# Patient Record
Sex: FEMALE | Race: WHITE | Employment: FULL TIME | URBAN - NONMETROPOLITAN AREA
[De-identification: names, ages, dates, MRNs, and addresses within clinical notes are randomized per-mention and may not be internally consistent; named-entity substitution may affect disease eponyms.]

---

## 2017-01-30 ENCOUNTER — TELEPHONE (OUTPATIENT)
Dept: FAMILY MEDICINE CLINIC | Age: 42
End: 2017-01-30

## 2017-01-30 ENCOUNTER — OFFICE VISIT (OUTPATIENT)
Dept: FAMILY MEDICINE CLINIC | Age: 42
End: 2017-01-30

## 2017-01-30 VITALS
DIASTOLIC BLOOD PRESSURE: 88 MMHG | WEIGHT: 196.4 LBS | SYSTOLIC BLOOD PRESSURE: 146 MMHG | TEMPERATURE: 99 F | HEIGHT: 66 IN | HEART RATE: 106 BPM | OXYGEN SATURATION: 99 % | BODY MASS INDEX: 31.57 KG/M2

## 2017-01-30 DIAGNOSIS — J98.8 RESPIRATORY INFECTION: Primary | ICD-10-CM

## 2017-01-30 PROCEDURE — 99213 OFFICE O/P EST LOW 20 MIN: CPT | Performed by: FAMILY MEDICINE

## 2017-01-30 RX ORDER — ALBUTEROL SULFATE 90 UG/1
2 AEROSOL, METERED RESPIRATORY (INHALATION) EVERY 6 HOURS PRN
Qty: 1 INHALER | Refills: 3 | Status: SHIPPED | OUTPATIENT
Start: 2017-01-30 | End: 2018-04-26 | Stop reason: SDUPTHER

## 2017-01-30 RX ORDER — PREDNISONE 10 MG/1
TABLET ORAL
Qty: 20 TABLET | Refills: 0 | Status: SHIPPED | OUTPATIENT
Start: 2017-01-30 | End: 2019-04-05

## 2017-01-30 ASSESSMENT — ENCOUNTER SYMPTOMS
SORE THROAT: 1
GASTROINTESTINAL NEGATIVE: 1
VOICE CHANGE: 1
COUGH: 1

## 2017-12-19 DIAGNOSIS — E89.0 POSTOPERATIVE HYPOTHYROIDISM: Primary | ICD-10-CM

## 2017-12-19 NOTE — TELEPHONE ENCOUNTER
Last Appt:  Visit date 1/30/17 GG  Next Appt:   Visit date not found  Med verified in [address] [place] [address] in 2016

## 2017-12-21 RX ORDER — LEVOTHYROXINE SODIUM 0.15 MG/1
TABLET ORAL
Qty: 90 TABLET | Refills: 3 | OUTPATIENT
Start: 2017-12-21

## 2018-02-12 ENCOUNTER — OFFICE VISIT (OUTPATIENT)
Dept: FAMILY MEDICINE CLINIC | Age: 43
End: 2018-02-12
Payer: COMMERCIAL

## 2018-02-12 VITALS
BODY MASS INDEX: 32.78 KG/M2 | DIASTOLIC BLOOD PRESSURE: 90 MMHG | SYSTOLIC BLOOD PRESSURE: 140 MMHG | WEIGHT: 204 LBS | HEIGHT: 66 IN | HEART RATE: 100 BPM | OXYGEN SATURATION: 98 %

## 2018-02-12 DIAGNOSIS — J30.0 CHRONIC VASOMOTOR RHINITIS: ICD-10-CM

## 2018-02-12 DIAGNOSIS — Z00.00 WELLNESS EXAMINATION: Primary | ICD-10-CM

## 2018-02-12 DIAGNOSIS — Z12.39 BREAST CANCER SCREENING: ICD-10-CM

## 2018-02-12 DIAGNOSIS — E89.0 POSTOPERATIVE HYPOTHYROIDISM: ICD-10-CM

## 2018-02-12 PROCEDURE — 99396 PREV VISIT EST AGE 40-64: CPT | Performed by: FAMILY MEDICINE

## 2018-02-12 NOTE — PATIENT INSTRUCTIONS
tablespoon of peanut butter, ½ ounce nuts or seeds, or ¼ cup of cooked beans equals 1 ounce of meat. · Learn how to read food labels for serving sizes and ingredients. Fast-food and convenience-food meals often contain few or no fruits or vegetables. Make sure you eat some fruits and vegetables to make the meal more nutritious. · Look at your food diary. For each food group, add up what you have eaten and then divide the total by the number of days. This will give you an idea of how much you are eating from each food group. See if you can find some ways to change your diet to make it more healthy. Start small  · Do not try to make dramatic changes to your diet all at once. You might feel that you are missing out on your favorite foods and then be more likely to fail. · Start slowly, and gradually change your habits. Try some of the following:  ¨ Use whole wheat bread instead of white bread. ¨ Use nonfat or low-fat milk instead of whole milk. ¨ Eat brown rice instead of white rice, and eat whole wheat pasta instead of white-flour pasta. ¨ Try low-fat cheeses and low-fat yogurt. ¨ Add more fruits and vegetables to meals and have them for snacks. ¨ Add lettuce, tomato, cucumber, and onion to sandwiches. ¨ Add fruit to yogurt and cereal.  Enjoy food  · You can still eat your favorite foods. You just may need to eat less of them. If your favorite foods are high in fat, salt, and sugar, limit how often you eat them, but do not cut them out entirely. · Eat a wide variety of foods. Make healthy choices when eating out  · The type of restaurant you choose can help you make healthy choices. Even fast-food chains are now offering more low-fat or healthier choices on the menu. · Choose smaller portions, or take half of your meal home. · When eating out, try:  ¨ A veggie pizza with a whole wheat crust or grilled chicken (instead of sausage or pepperoni).   ¨ Pasta with roasted vegetables, grilled chicken, or

## 2018-02-24 ENCOUNTER — HOSPITAL ENCOUNTER (OUTPATIENT)
Dept: LAB | Age: 43
Setting detail: SPECIMEN
Discharge: HOME | End: 2018-02-24
Payer: COMMERCIAL

## 2018-02-24 DIAGNOSIS — E89.0 POSTOPERATIVE HYPOTHYROIDISM: ICD-10-CM

## 2018-02-24 DIAGNOSIS — Z00.00 WELLNESS EXAMINATION: ICD-10-CM

## 2018-02-24 LAB
-: ABNORMAL
ABSOLUTE EOS #: 0.2 K/UL (ref 0–0.4)
ABSOLUTE IMMATURE GRANULOCYTE: ABNORMAL K/UL (ref 0–0.3)
ABSOLUTE LYMPH #: 1.7 K/UL (ref 1–4.8)
ABSOLUTE MONO #: 0.4 K/UL (ref 0.1–1.2)
AMORPHOUS: ABNORMAL
ANION GAP SERPL CALCULATED.3IONS-SCNC: 12 MMOL/L (ref 9–17)
BACTERIA: ABNORMAL
BASOPHILS # BLD: 1 % (ref 0–1)
BASOPHILS ABSOLUTE: 0 K/UL (ref 0–0.2)
BILIRUBIN URINE: NEGATIVE
BUN BLDV-MCNC: 14 MG/DL (ref 6–20)
BUN/CREAT BLD: 17 (ref 9–20)
CALCIUM SERPL-MCNC: 9.3 MG/DL (ref 8.6–10.4)
CASTS UA: ABNORMAL /LPF (ref 0–2)
CHLORIDE BLD-SCNC: 100 MMOL/L (ref 98–107)
CO2: 27 MMOL/L (ref 20–31)
COLOR: ABNORMAL
COMMENT UA: ABNORMAL
CREAT SERPL-MCNC: 0.84 MG/DL (ref 0.5–0.9)
CRYSTALS, UA: ABNORMAL /HPF
DIFFERENTIAL TYPE: ABNORMAL
EOSINOPHILS RELATIVE PERCENT: 3 % (ref 1–7)
EPITHELIAL CELLS UA: ABNORMAL /HPF (ref 0–5)
GFR AFRICAN AMERICAN: >60 ML/MIN
GFR NON-AFRICAN AMERICAN: >60 ML/MIN
GFR SERPL CREATININE-BSD FRML MDRD: NORMAL ML/MIN/{1.73_M2}
GFR SERPL CREATININE-BSD FRML MDRD: NORMAL ML/MIN/{1.73_M2}
GLUCOSE BLD-MCNC: 94 MG/DL (ref 70–99)
GLUCOSE URINE: NEGATIVE
HCT VFR BLD CALC: 36.1 % (ref 36–46)
HEMOGLOBIN: 11.5 G/DL (ref 12–16)
IMMATURE GRANULOCYTES: ABNORMAL %
KETONES, URINE: NEGATIVE
LEUKOCYTE ESTERASE, URINE: NEGATIVE
LYMPHOCYTES # BLD: 31 % (ref 16–46)
MCH RBC QN AUTO: 24.9 PG (ref 26–34)
MCHC RBC AUTO-ENTMCNC: 31.9 G/DL (ref 31–37)
MCV RBC AUTO: 78 FL (ref 80–100)
MONOCYTES # BLD: 8 % (ref 4–11)
MUCUS: ABNORMAL
NITRITE, URINE: NEGATIVE
NRBC AUTOMATED: ABNORMAL PER 100 WBC
OTHER OBSERVATIONS UA: ABNORMAL
PDW BLD-RTO: 16.8 % (ref 11–14.5)
PH UA: 5.5 (ref 5–6)
PLATELET # BLD: 291 K/UL (ref 140–450)
PLATELET ESTIMATE: ABNORMAL
PMV BLD AUTO: 8.8 FL (ref 6–12)
POTASSIUM SERPL-SCNC: 4.6 MMOL/L (ref 3.7–5.3)
PROTEIN UA: NEGATIVE
RBC # BLD: 4.63 M/UL (ref 4–5.2)
RBC # BLD: ABNORMAL 10*6/UL
RBC UA: ABNORMAL /HPF (ref 0–4)
RENAL EPITHELIAL, UA: ABNORMAL /HPF
SEG NEUTROPHILS: 57 % (ref 43–77)
SEGMENTED NEUTROPHILS ABSOLUTE COUNT: 3.3 K/UL (ref 1.8–7.7)
SODIUM BLD-SCNC: 139 MMOL/L (ref 135–144)
SPECIFIC GRAVITY UA: 1.03 (ref 1.01–1.02)
T3 FREE: 2.51 PG/ML (ref 2.02–4.43)
THYROXINE, FREE: 1.47 NG/DL (ref 0.93–1.7)
TRICHOMONAS: ABNORMAL
TSH SERPL DL<=0.05 MIU/L-ACNC: 3.15 MIU/L (ref 0.3–5)
TURBIDITY: ABNORMAL
URINE HGB: ABNORMAL
UROBILINOGEN, URINE: NORMAL
WBC # BLD: 5.6 K/UL (ref 3.5–11)
WBC # BLD: ABNORMAL 10*3/UL
WBC UA: ABNORMAL /HPF (ref 0–4)
YEAST: ABNORMAL

## 2018-02-24 PROCEDURE — 84482 T3 REVERSE: CPT

## 2018-02-24 PROCEDURE — 84439 ASSAY OF FREE THYROXINE: CPT

## 2018-02-24 PROCEDURE — 85025 COMPLETE CBC W/AUTO DIFF WBC: CPT

## 2018-02-24 PROCEDURE — 80048 BASIC METABOLIC PNL TOTAL CA: CPT

## 2018-02-24 PROCEDURE — 84481 FREE ASSAY (FT-3): CPT

## 2018-02-24 PROCEDURE — 84443 ASSAY THYROID STIM HORMONE: CPT

## 2018-02-24 PROCEDURE — 81001 URINALYSIS AUTO W/SCOPE: CPT

## 2018-02-24 PROCEDURE — 36415 COLL VENOUS BLD VENIPUNCTURE: CPT

## 2018-02-26 ENCOUNTER — PATIENT MESSAGE (OUTPATIENT)
Dept: FAMILY MEDICINE CLINIC | Age: 43
End: 2018-02-26

## 2018-02-26 RX ORDER — LEVOTHYROXINE SODIUM 0.15 MG/1
TABLET ORAL
Qty: 90 TABLET | Refills: 3 | Status: SHIPPED | OUTPATIENT
Start: 2018-02-26 | End: 2019-04-05 | Stop reason: SDUPTHER

## 2018-03-01 LAB — T3 REVERSE: 15.9 NG/DL (ref 9–27)

## 2018-03-05 ENCOUNTER — PATIENT MESSAGE (OUTPATIENT)
Dept: FAMILY MEDICINE CLINIC | Age: 43
End: 2018-03-05

## 2018-03-05 RX ORDER — LIOTHYRONINE SODIUM 5 UG/1
5 TABLET ORAL DAILY
Qty: 90 TABLET | Refills: 3 | Status: SHIPPED | OUTPATIENT
Start: 2018-03-05 | End: 2019-04-05 | Stop reason: SDUPTHER

## 2018-03-05 NOTE — TELEPHONE ENCOUNTER
From: Sydnie Palmer  To: Charlotte Whitney MD  Sent: 3/5/2018 1:38 PM EST  Subject: Test Results Question    No, I don't believe we can stop my levo. My intentions are the same as yours - add cytomel & adjust the levo dosage. ----- Message -----  From: Charlotte Whitney MD  Sent: 3/5/2018 12:54 PM EST  To: Estella Tate  Subject: RE: Test Results Question  Are asking to change to just cytomel and stop levothyroxine? What my intent was is to give a small adjunctive treatment with levothyroxine and slowly add to your levothyroxine then start decreasing your levothyroxine in time as the cytomel dose is adjusted and see if we get a better balance  Let me know your expectations.    ----- Message -----   From: Estella Tate   Sent: 3/5/2018 10:30 AM EST   To: Charlotte Whitney MD  Subject: Test Results Question    Could the cytomel (generic equivalent needed for insurance) be sent to [facility]? For that medication it is cheaper to use my insurance's mail order prescription service. Thank you  Trinidad Rust  ----- Message -----  From: Charlotte Whitney MD  Sent: 2/26/2018 4:49 PM EST  To: Estella Tate  Subject: RE: Test Results Question  I sent in your thyroid medicine  I would be adding cytomel to what you are on if we were going to be approaching it in this way  Your anemia is more likely to cause low blood pressure  Menstruation most likely causing hgb drop and would start iron daily       ----- Message -----   From: Estella Leondixie Shresthaer   Sent: 2/26/2018 2:55 PM EST   To: Charlotte Whitney MD  Subject: Test Results Question    Could the anemia be causing the high blood pressure? Do you recommend further testing? (FYI I was menstruating when this blood was drawn)  Also, would it be possible to have a refill for my thyroid meds sent in? I am getting low, I understand that these meds may be changing depending on these other factors & any further testing, a temporary or short term supply would be fine.   Thank you  Trinidad Rust  ----- Message

## 2018-03-06 ENCOUNTER — HOSPITAL ENCOUNTER (OUTPATIENT)
Dept: LAB | Age: 43
Setting detail: SPECIMEN
Discharge: HOME | End: 2018-03-06
Payer: COMMERCIAL

## 2018-03-06 DIAGNOSIS — D64.9 ANEMIA, UNSPECIFIED TYPE: ICD-10-CM

## 2018-03-06 DIAGNOSIS — D64.9 ANEMIA, UNSPECIFIED TYPE: Primary | ICD-10-CM

## 2018-03-06 LAB
DATE, STOOL #1: NORMAL
DATE, STOOL #2: NORMAL
DATE, STOOL #3: NORMAL
HEMOCCULT SP1 STL QL: NEGATIVE
HEMOCCULT SP2 STL QL: NORMAL
HEMOCCULT SP3 STL QL: NORMAL
TIME, STOOL #1: 700
TIME, STOOL #2: NORMAL
TIME, STOOL #3: NORMAL

## 2018-03-06 PROCEDURE — 82274 ASSAY TEST FOR BLOOD FECAL: CPT

## 2018-03-08 ENCOUNTER — PATIENT MESSAGE (OUTPATIENT)
Dept: FAMILY MEDICINE CLINIC | Age: 43
End: 2018-03-08

## 2018-04-25 ENCOUNTER — PATIENT MESSAGE (OUTPATIENT)
Dept: FAMILY MEDICINE CLINIC | Age: 43
End: 2018-04-25

## 2018-04-26 RX ORDER — ALBUTEROL SULFATE 90 UG/1
2 AEROSOL, METERED RESPIRATORY (INHALATION) EVERY 6 HOURS PRN
Qty: 1 INHALER | Refills: 5 | Status: SHIPPED | OUTPATIENT
Start: 2018-04-26 | End: 2020-03-13 | Stop reason: SDUPTHER

## 2019-04-05 ENCOUNTER — OFFICE VISIT (OUTPATIENT)
Dept: FAMILY MEDICINE CLINIC | Age: 44
End: 2019-04-05
Payer: COMMERCIAL

## 2019-04-05 VITALS
OXYGEN SATURATION: 98 % | BODY MASS INDEX: 32.47 KG/M2 | SYSTOLIC BLOOD PRESSURE: 124 MMHG | HEART RATE: 100 BPM | DIASTOLIC BLOOD PRESSURE: 80 MMHG | HEIGHT: 66 IN | WEIGHT: 202 LBS

## 2019-04-05 DIAGNOSIS — Z00.00 WELLNESS EXAMINATION: Primary | ICD-10-CM

## 2019-04-05 PROCEDURE — 99396 PREV VISIT EST AGE 40-64: CPT | Performed by: FAMILY MEDICINE

## 2019-04-05 RX ORDER — LEVOTHYROXINE SODIUM 0.15 MG/1
TABLET ORAL
Qty: 30 TABLET | Refills: 3 | Status: SHIPPED | OUTPATIENT
Start: 2019-04-05 | End: 2019-05-03 | Stop reason: SDUPTHER

## 2019-04-05 RX ORDER — LIOTHYRONINE SODIUM 5 UG/1
5 TABLET ORAL DAILY
Qty: 30 TABLET | Refills: 3 | Status: SHIPPED | OUTPATIENT
Start: 2019-04-05 | End: 2019-05-03 | Stop reason: SDUPTHER

## 2019-04-05 ASSESSMENT — PATIENT HEALTH QUESTIONNAIRE - PHQ9
SUM OF ALL RESPONSES TO PHQ QUESTIONS 1-9: 0
SUM OF ALL RESPONSES TO PHQ9 QUESTIONS 1 & 2: 0
2. FEELING DOWN, DEPRESSED OR HOPELESS: 0
1. LITTLE INTEREST OR PLEASURE IN DOING THINGS: 0
SUM OF ALL RESPONSES TO PHQ QUESTIONS 1-9: 0

## 2019-04-05 NOTE — PATIENT INSTRUCTIONS
Patient Education        Eating Healthy Foods: Care Instructions  Your Care Instructions    Eating healthy foods can help lower your risk for disease. Healthy food gives you energy and keeps your heart strong, your brain active, your muscles working, and your bones strong. A healthy diet includes a variety of foods from the basic food groups: grains, vegetables, fruits, milk and milk products, and meat and beans. Some people may eat more of their favorite foods from only one food group and, as a result, miss getting the nutrients they need. So, it is important to pay attention not only to what you eat but also to what you are missing from your diet. You can eat a healthy, balanced diet by making a few small changes. Follow-up care is a key part of your treatment and safety. Be sure to make and go to all appointments, and call your doctor if you are having problems. It's also a good idea to know your test results and keep a list of the medicines you take. How can you care for yourself at home? Look at what you eat  · Keep a food diary for a week or two and record everything you eat or drink. Track the number of servings you eat from each food group. · For a balanced diet every day, eat a variety of:  ? 6 or more ounce-equivalents of grains, such as cereals, breads, crackers, rice, or pasta, every day. An ounce-equivalent is 1 slice of bread, 1 cup of ready-to-eat cereal, or ½ cup of cooked rice, cooked pasta, or cooked cereal.  ? 2½ cups of vegetables, especially:  § Dark-green vegetables such as broccoli and spinach. § Orange vegetables such as carrots and sweet potatoes. § Dry beans (such as lugo and kidney beans) and peas (such as lentils). ? 2 cups of fresh, frozen, or canned fruit. A small apple or 1 banana or orange equals 1 cup. ? 3 cups of nonfat or low-fat milk, yogurt, or other milk products. ? 5½ ounces of meat and beans, such as chicken, fish, lean meat, beans, nuts, and seeds.  One egg, 1 tablespoon of peanut butter, ½ ounce nuts or seeds, or ¼ cup of cooked beans equals 1 ounce of meat. · Learn how to read food labels for serving sizes and ingredients. Fast-food and convenience-food meals often contain few or no fruits or vegetables. Make sure you eat some fruits and vegetables to make the meal more nutritious. · Look at your food diary. For each food group, add up what you have eaten and then divide the total by the number of days. This will give you an idea of how much you are eating from each food group. See if you can find some ways to change your diet to make it more healthy. Start small  · Do not try to make dramatic changes to your diet all at once. You might feel that you are missing out on your favorite foods and then be more likely to fail. · Start slowly, and gradually change your habits. Try some of the following:  ? Use whole wheat bread instead of white bread. ? Use nonfat or low-fat milk instead of whole milk. ? Eat brown rice instead of white rice, and eat whole wheat pasta instead of white-flour pasta. ? Try low-fat cheeses and low-fat yogurt. ? Add more fruits and vegetables to meals and have them for snacks. ? Add lettuce, tomato, cucumber, and onion to sandwiches. ? Add fruit to yogurt and cereal.  Enjoy food  · You can still eat your favorite foods. You just may need to eat less of them. If your favorite foods are high in fat, salt, and sugar, limit how often you eat them, but do not cut them out entirely. · Eat a wide variety of foods. Make healthy choices when eating out  · The type of restaurant you choose can help you make healthy choices. Even fast-food chains are now offering more low-fat or healthier choices on the menu. · Choose smaller portions, or take half of your meal home. · When eating out, try:  ? A veggie pizza with a whole wheat crust or grilled chicken (instead of sausage or pepperoni).   ? Pasta with roasted vegetables, grilled chicken, or marinara sauce instead of cream sauce. ? A vegetable wrap or grilled chicken wrap. ? Broiled or poached food instead of fried or breaded items. Make healthy choices easy  · Buy packaged, prewashed, ready-to-eat fresh vegetables and fruits, such as baby carrots, salad mixes, and chopped or shredded broccoli and cauliflower. · Buy packaged, presliced fruits, such as melon or pineapple. · Choose 100% fruit or vegetable juice instead of soda. Limit juice intake to 4 to 6 oz (½ to ¾ cup) a day. · Blend low-fat yogurt, fruit juice, and canned or frozen fruit to make a smoothie for breakfast or a snack. Where can you learn more? Go to https://[email or web address] Nation.Peeky. org and sign in to your [employer] account. Enter N624 in the [employer] box to learn more about \"Eating Healthy Foods: Care Instructions. \"     If you do not have an account, please click on the \"Sign Up Now\" link. Current as of: March 28, 2018  Content Version: 11.9  © 6054-1076 [employer]. Care instructions adapted under license by [facility] ([facility]). If you have questions about a medical condition or this instruction, always ask your healthcare professional. [address] any warranty or liability for your use of this information. Patient Education        Learning About Diabetes and Exercise  Can you exercise if you have diabetes? When you have diabetes, it's important to get regular exercise. This helps control your blood sugar level. You can still play sports, run, ride a bike, go swimming, and do other activities when you have diabetes. How can exercise help you manage diabetes? Your body turns the food you eat into glucose, a type of sugar. You need this sugar for energy. When you have diabetes, the sugar builds up in your blood. But when you exercise, your body uses sugar.  This helps keep it from building up in your blood and results in lower blood sugar and better control of diabetes. Exercise may help you in other ways too. It can help you reach and stay at a healthy weight. It also helps improve blood pressure and cholesterol, which can reduce the risk of heart disease. Exercise can make you feel stronger and happier. It can help you relax and sleep better, and give you confidence in other things you do. How can you exercise safely? Before you start a new exercise program, talk to your doctor about how and when to exercise. You may need to have a medical exam and tests before you begin. Some types of exercise can be harmful if your diabetes is causing other problems, such as problems with your feet. Your doctor can tell you what types of exercise are good choices for you. These tips can help you exercise safely when you have diabetes. If your diabetes is controlled by diet or medicine that doesn't lower your blood sugar, you don't need to eat a snack before you exercise. · Check your blood sugar before you exercise. And be careful about what you eat. ? If your blood sugar is less than 100, eat a carbohydrate snack before you exercise. ? Be careful when you exercise if your blood sugar is over 300. High blood sugar can make you dehydrated. And that makes your blood sugar levels go even higher. If you have ketones in your blood or urine and your blood sugar is over 300, do not exercise. · Don't try to do too much at first. Build up your exercise program bit by bit. Try to get at least 30 minutes of exercise on most days of the week. Walking is a good choice. You also may want to do other activities, such as riding a bike or swimming. You might try running or gardening. Try to include muscle-strengthening exercises at least 2 times a week. These exercises include push-ups and weight training. You can also use rubber tubing or stretch bands. You stretch or pull the tubing or band to build muscle strength.  If you want to exercise more, slowly increase how hard or long you

## 2019-04-05 NOTE — PROGRESS NOTES
History and Physical      Laury Jalloh  YOB: 1975    Date of Service:  4/5/2019    Chief Complaint:   Laury Jalloh is a 39 y.o. female who presents for complete physical examination.     HPI: has been doing ok     Wt Readings from Last 3 Encounters:   04/05/19 202 lb (91.6 kg)   02/12/18 204 lb (92.5 kg)   01/30/17 196 lb 6.4 oz (89.1 kg)     BP Readings from Last 3 Encounters:   04/05/19 124/80   02/12/18 (!) 140/90   01/30/17 (!) 146/88       Patient Active Problem List   Diagnosis    Hypothyroid    Thyroid cancer ([facility]    Non morbid obesity due to excess calories    Chronic vasomotor rhinitis       Preventive Care:  Health Maintenance   Topic Date Due    HIV screen  04/15/1989    DTaP/Tdap/Td vaccine (1 - Tdap) 04/15/1993    Cervical cancer screen  08/25/2017    TSH testing  02/24/2019    Lipid screen  10/29/2021    Flu vaccine  Completed      Hx abnormal PAP: no  Sexual activity: has sex with males   Self-breast exams: yes  Previous DEXA scan: no  Last eye exam: 2018, normal  Exercise: no regular exercise  Seatbelt use: yes   Lipid panel:   Lab Results   Component Value Date    CHOL 168 10/29/2016    TRIG 168 (H) 10/29/2016    HDL 37 (L) 10/29/2016        Living will:  no,   additional information provided reviewed     Immunization History   Administered Date(s) Administered    Influenza Virus Vaccine 11/20/2014, 09/25/2015, 11/17/2018    Influenza, Milderd Mons, 3 yrs and older, IM, PF (Fluzone 3 yrs and older or Afluria 5 yrs and older) 10/07/2016    Tetanus 07/15/1993       Allergies   Allergen Reactions    Amoxicillin      Outpatient Medications Marked as Taking for the 4/5/19 encounter (Office Visit) with Bernie Crenshaw MD   Medication Sig Dispense Refill    albuterol sulfate HFA (PROVENTIL HFA) 108 (90 Base) MCG/ACT inhaler Inhale 2 puffs into the lungs every 6 hours as needed for Wheezing Any albuterol inhaler ok to dispense 1 Inhaler 5    liothyronine (CYTOMEL) 5 MCG tablet Take 1 tablet by mouth daily 90 tablet 3    levothyroxine (SYNTHROID) 150 MCG tablet TAKE 1 TABLET DAILY 90 tablet 3    Multiple Vitamins-Minerals (THERAPEUTIC MULTIVITAMIN-MINERALS) tablet Take 1 tablet by mouth daily.          Past Medical History:   Diagnosis Date    Hashimoto's thyroiditis     Irregular menses     history of     Pilar cyst     Skin lesion of scalp     Skin tag     Thyroid cancer (HCC)      Past Surgical History:   Procedure Laterality Date     SECTION      x2    CYST REMOVAL      from head     THYROIDECTOMY, PARTIAL      x2    TUBAL LIGATION      WISDOM TOOTH EXTRACTION  -     Family History   Problem Relation Age of Onset    High Blood Pressure Mother     Diabetes Mother         pre diabetes    Cancer Father         Skin and pancreatic    Heart Disease Father     Heart Attack Father         x3 prior to his death at age of 72    Perez Prostate Cancer Father     Breast Cancer Maternal Grandmother         great-grandmother    Diabetes Maternal Aunt     Diabetes Paternal [de-identified]     Other Daughter         reactive airway disorder      Social History     Socioeconomic History    Marital status:      Spouse name: Not on file    Number of children: Not on file    Years of education: Not on file    Highest education level: Not on file   Occupational History    Not on file   Social Needs    Financial resource strain: Not on file    Food insecurity:     Worry: Not on file     Inability: Not on file    Transportation needs:     Medical: Not on file     Non-medical: Not on file   Tobacco Use    Smoking status: Never Smoker    Smokeless tobacco: Never Used   Substance and Sexual Activity    Alcohol use: Yes     Comment: 2 drinks per week    Drug use: No    Sexual activity: Not on file   Lifestyle    Physical activity:     Days per week: Not on file     Minutes per session: Not on file    Stress: Not on file   Relationships    Social connections: Talks on phone: Not on file     Gets together: Not on file     Attends Tenriism service: Not on file     Active member of club or organization: Not on file     Attends meetings of clubs or organizations: Not on file     Relationship status: Not on file    Intimate partner violence:     Fear of current or ex partner: Not on file     Emotionally abused: Not on file     Physically abused: Not on file     Forced sexual activity: Not on file   Other Topics Concern    Not on file   Social History Narrative    Not on file       Review of Systems:  Lump on left axilla     Physical Exam:   Vitals:    04/05/19 1527   BP: 124/80   Site: Right Upper Arm   Position: Sitting   Pulse: 113   SpO2: 98%   Weight: 202 lb (91.6 kg)   Height: 5' 5.5\" (1.664 m)     Body mass index is 33.1 kg/m². Constitutional: She is oriented to person, place, and time. She appears well-developed and well-nourished. No distress. HEENT:   Head: Normocephalic and atraumatic. Right Ear: Tympanic membrane, external ear and ear canal normal.   Left Ear: Tympanic membrane, external ear and ear canal normal.   Nose: mild rhinitis   Mouth/Throat: Oropharynx is clear and moist, and mucous membranes are normal.  There is no cervical adenopathy. Eyes: Conjunctivae and extraocular motions are normal. Pupils are equal, round, and reactive to light. Neck: Neck supple. No JVD present. Carotid bruit is not present. No mass and no thyromegaly present. Cardiovascular: Normal rate, regular rhythm, normal heart sounds and intact distal pulses. Exam reveals no gallop and no friction rub. No murmur heard. Pulmonary/Chest: Effort normal and breath sounds normal. No respiratory distress. She has no wheezes, rhonchi or rales. Abdominal: Soft, non-tender. Bowel sounds and aorta are normal. She exhibits no organomegaly, mass or bruit. Genitourinary: deferred . Breast exam:  not examined. Musculoskeletal: Normal range of motion, no synovitis.  She exhibits no edema. Neurological: She is alert and oriented to person, place, and time. She has normal reflexes. No cranial nerve deficit. Coordination normal.   Skin: Skin is warm and dry. There is no rash or erythema. No suspicious lesions noted. Appears to have seborrhea on her left side of scalp   Small lymph node left axilla   Psychiatric: She has a normal mood and affect.  Her speech is normal and behavior is normal. Judgment, cognition and memory are normal.     Assessment/Plan:    Patient Active Problem List   Diagnosis    Hypothyroid    [facility]roid cancer ([facility] [address]    Non morbid obesity due to excess calories    Chronic vasomotor rhinitis   most likely reactive lymph left axilla  Seborrhea dermatitis       Labs per orders  Will monitor lesion  Healthy diet and fitness  Mammogram   Reviewed advanced directives  Refuses hiv  Encourage tdap  Recommend gyn follow up   nizoral shampoo and selsun for scalp  Vitamin d 3 2000 iu a day  As long as well follow up

## 2019-04-29 LAB
A/G RATIO: 1.5 RATIO
AGE FOR GFR: 45
ALBUMIN: 4.1 G/DL (ref 3.5–5)
ALK PHOSPHATASE: 75 UNITS/L (ref 38–126)
ALT SERPL-CCNC: 28 UNITS/L (ref 9–52)
ANION GAP SERPL CALCULATED.3IONS-SCNC: 11 MMOL/L
ANISOCYTOSIS: ABNORMAL
AST SERPL-CCNC: 16 UNITS/L (ref 14–36)
BASOPHILS # BLD: 0.07 THOU/MM3 (ref 0–0.3)
BILIRUB SERPL-MCNC: 0.3 MG/DL (ref 0.2–1.3)
BUN BLDV-MCNC: 14 MG/DL (ref 7–17)
CALCIUM SERPL-MCNC: 9.6 MG/DL (ref 8.4–10.2)
CHLORIDE BLD-SCNC: 106 MMOL/L (ref 98–120)
CHOLESTEROL/HDL RATIO: 4.3 RATIO (ref 0–4.5)
CHOLESTEROL: 164 MG/DL (ref 50–200)
CO2: 27 MMOL/L (ref 22–31)
CREAT SERPL-MCNC: 0.8 MG/DL (ref 0.5–1)
DIFFERENTIAL: AUTOMATED DIFF
EGFR BF: 94 ML/MIN/1.73 M2
EGFR BM: 127 ML/MIN/1.73 M2
EGFR WF: 78 ML/MIN/1.73 M2
EGFR WM: 105 ML/MIN/1.73 M2
EOSINOPHIL # BLD: 0.3 THOU/MM3 (ref 0–1.1)
GLOBULIN: 2.8 G/DL
GLUCOSE: 87 MG/DL (ref 65–105)
HCT VFR BLD CALC: 36.8 % (ref 37–47)
HDL, DIRECT: 38 MG/DL (ref 36–68)
HEMOGLOBIN: 11.4 G/DL (ref 12–16)
LDL CHOLESTEROL CALCULATED: 82.4 MG/DL (ref 0–160)
LYMPHOCYTES # BLD: 1.93 THOU/MM3 (ref 1–5.5)
MCH RBC QN AUTO: 24.1 PG (ref 28.5–32)
MCHC RBC AUTO-ENTMCNC: 30.9 G/DL (ref 32–37)
MCV RBC AUTO: 78 FL (ref 80–94)
MICROCYTOSIS: ABNORMAL
MONOCYTES # BLD: 0.4 THOU/MM3 (ref 0.1–1)
MORPHOLOGY: ABNORMAL
NEUTROPHILS: 2.63 THOU/MM3 (ref 2–8.1)
PDW BLD-RTO: 14.3 % (ref 8.5–15.5)
PLATELET # BLD: 298 THOU/MM3 (ref 130–400)
PMV BLD AUTO: 7.8 FL (ref 7.4–11)
POTASSIUM SERPL-SCNC: 4.2 MMOL/L (ref 3.6–5)
RBC # BLD: 4.72 M/UL (ref 4.2–5.4)
SODIUM BLD-SCNC: 140 MMOL/L (ref 135–145)
T3 FREE: NORMAL
T4 FREE: 1.66 NG/DL (ref 0.78–2.1)
TOTAL PROTEIN: 6.9 G/DL (ref 6.3–8.2)
TRIGL SERPL-MCNC: 218 MG/DL (ref 10–250)
TSH SERPL DL<=0.05 MIU/L-ACNC: <0.02 MIU/ML (ref 0.49–4.6)
VLDLC SERPL CALC-MCNC: 44 MG/DL (ref 0–40)
WBC # BLD: 5.33 THOU/ML3 (ref 4.8–10)

## 2019-05-03 RX ORDER — LIOTHYRONINE SODIUM 5 UG/1
5 TABLET ORAL DAILY
Qty: 90 TABLET | Refills: 3 | Status: SHIPPED | OUTPATIENT
Start: 2019-05-03 | End: 2020-05-28

## 2019-05-03 RX ORDER — LEVOTHYROXINE SODIUM 0.15 MG/1
TABLET ORAL
Qty: 90 TABLET | Refills: 3 | Status: SHIPPED | OUTPATIENT
Start: 2019-05-03 | End: 2019-07-11 | Stop reason: SDUPTHER

## 2019-10-17 RX ORDER — LEVOTHYROXINE SODIUM 0.15 MG/1
TABLET ORAL
Qty: 90 TABLET | Refills: 3 | OUTPATIENT
Start: 2019-10-17

## 2020-03-16 RX ORDER — ALBUTEROL SULFATE 90 UG/1
2 AEROSOL, METERED RESPIRATORY (INHALATION) EVERY 6 HOURS PRN
Qty: 1 INHALER | Refills: 5 | Status: SHIPPED | OUTPATIENT
Start: 2020-03-16 | End: 2020-03-19 | Stop reason: SDUPTHER

## 2020-03-19 ENCOUNTER — TELEPHONE (OUTPATIENT)
Dept: FAMILY MEDICINE CLINIC | Age: 45
End: 2020-03-19

## 2020-03-19 RX ORDER — ALBUTEROL SULFATE 90 UG/1
2 AEROSOL, METERED RESPIRATORY (INHALATION) EVERY 6 HOURS PRN
Qty: 3 INHALER | Refills: 3 | Status: SHIPPED | OUTPATIENT
Start: 2020-03-19 | End: 2022-01-13 | Stop reason: SDUPTHER

## 2020-08-17 RX ORDER — LIOTHYRONINE SODIUM 5 UG/1
TABLET ORAL
Qty: 90 TABLET | Refills: 0 | Status: SHIPPED | OUTPATIENT
Start: 2020-08-17 | End: 2020-10-23 | Stop reason: SDUPTHER

## 2020-08-17 NOTE — TELEPHONE ENCOUNTER
Mitali Almaraz is requesting a refill on the following medication(s):  Requested Prescriptions     Pending Prescriptions Disp Refills    liothyronine (CYTOMEL) 5 MCG tablet [Pharmacy Med Name: LIOTHYRONINE SOD TABS 5MCG] 90 tablet 3     Sig: TAKE 1 TABLET DAILY       Last Visit Date (If Applicable):  0/7/4658    Next Visit Date:    Visit date not found

## 2020-10-23 ENCOUNTER — TELEPHONE (OUTPATIENT)
Dept: FAMILY MEDICINE CLINIC | Age: 45
End: 2020-10-23

## 2020-10-23 RX ORDER — LIOTHYRONINE SODIUM 5 UG/1
TABLET ORAL
Qty: 90 TABLET | Refills: 3 | Status: SHIPPED | OUTPATIENT
Start: 2020-10-23 | End: 2020-11-19

## 2020-10-23 RX ORDER — LEVOTHYROXINE SODIUM 0.15 MG/1
150 TABLET ORAL DAILY
Qty: 90 TABLET | Refills: 3 | Status: SHIPPED | OUTPATIENT
Start: 2020-10-23 | End: 2020-10-27 | Stop reason: SDUPTHER

## 2020-10-23 NOTE — TELEPHONE ENCOUNTER
Pt called asking if you can send a short term supply of her thyroid medication to [facility] in [place] until her VV on Tuesday the 27th with you. Pt states she understands she needs and appt and lab work done but only has seven pills left.

## 2020-10-23 NOTE — TELEPHONE ENCOUNTER
[facility] since [facility] has an appointment and may get prior to running out if not will fill on tuesday

## 2020-10-27 ENCOUNTER — VIRTUAL VISIT (OUTPATIENT)
Dept: FAMILY MEDICINE CLINIC | Age: 45
End: 2020-10-27
Payer: COMMERCIAL

## 2020-10-27 PROCEDURE — 99396 PREV VISIT EST AGE 40-64: CPT | Performed by: FAMILY MEDICINE

## 2020-10-27 RX ORDER — MULTIVIT WITH MINERALS/LUTEIN
250 TABLET ORAL DAILY
COMMUNITY

## 2020-10-27 RX ORDER — LANOLIN ALCOHOL/MO/W.PET/CERES
1000 CREAM (GRAM) TOPICAL DAILY
COMMUNITY
End: 2021-04-15

## 2020-10-27 RX ORDER — CLOBETASOL PROPIONATE 0.46 MG/ML
SOLUTION TOPICAL
Qty: 300 ML | Refills: 3 | Status: SHIPPED | OUTPATIENT
Start: 2020-10-27 | End: 2022-01-20 | Stop reason: SDUPTHER

## 2020-10-27 RX ORDER — LEVOTHYROXINE SODIUM 0.15 MG/1
150 TABLET ORAL DAILY
Qty: 30 TABLET | Refills: 3 | Status: SHIPPED | OUTPATIENT
Start: 2020-10-27 | End: 2021-10-14 | Stop reason: SDUPTHER

## 2020-10-27 RX ORDER — ACETAMINOPHEN 160 MG
TABLET,DISINTEGRATING ORAL
COMMUNITY

## 2020-10-27 RX ORDER — LIOTHYRONINE SODIUM 5 UG/1
TABLET ORAL
Qty: 90 TABLET | Refills: 3 | Status: CANCELLED | OUTPATIENT
Start: 2020-10-27

## 2020-10-27 ASSESSMENT — PATIENT HEALTH QUESTIONNAIRE - PHQ9
SUM OF ALL RESPONSES TO PHQ QUESTIONS 1-9: 0
2. FEELING DOWN, DEPRESSED OR HOPELESS: 0
SUM OF ALL RESPONSES TO PHQ9 QUESTIONS 1 & 2: 0
1. LITTLE INTEREST OR PLEASURE IN DOING THINGS: 0
SUM OF ALL RESPONSES TO PHQ QUESTIONS 1-9: 0
SUM OF ALL RESPONSES TO PHQ QUESTIONS 1-9: 0

## 2020-10-27 ASSESSMENT — ENCOUNTER SYMPTOMS
SHORTNESS OF BREATH: 0
GASTROINTESTINAL NEGATIVE: 1
COUGH: 1

## 2020-10-27 NOTE — PROGRESS NOTES
Subjective:      Patient ID: Eugene Vides is a 54 y.o. female. Has problems with flaky scalp and has tried a different shampoos without benefit  Has lesion in her left axilla that has been there for several years that fluctuates with her menses and or illness  Continues to feel fatigued   Has problems with her sleeping  Has heavy menses   Has a history of slight anemia   Has respiratory symptoms with an occasional cough      Review of Systems   HENT: Positive for congestion. Respiratory: Positive for cough. Negative for shortness of breath. Cardiovascular: Negative. Gastrointestinal: Negative. Genitourinary: Negative. Hematological: Negative. Psychiatric/Behavioral: Positive for sleep disturbance. Objective:   Physical Exam  Vitals signs reviewed. Constitutional:       Appearance: Normal appearance. HENT:      Head: Normocephalic and atraumatic. Mouth/Throat:      Mouth: Mucous membranes are moist.   Eyes:      Extraocular Movements: Extraocular movements intact. Pulmonary:      Effort: Pulmonary effort is normal.   Neurological:      General: No focal deficit present. Mental Status: She is alert and oriented to person, place, and time. Psychiatric:         Mood and Affect: Mood normal.         Assessment:      Hypothyroidism  Lesions scalp  Respiratory infection  Fatigue       Plan: Will give a trial of topical steroid solution to see if helps with scalp issues  Healthy diet and fitness  Mammogram ordered  Reviewed colonoscopy and colon cancer screening at this time she is not interested        Eugene Vides is a 54 y.o. female being evaluated by a Virtual Visit (video visit) encounter to address concerns as mentioned above. A caregiver was present when appropriate.  Due to this being a TeleHealth encounter ([id number] health emergency), evaluation of the following organ systems was limited: Vitals/Constitutional/EENT/Resp/CV/GI//MS/Neuro/Skin/Heme-Lymph-Imm. Pursuant to the emergency declaration under the [address] [place] [address] and the [employer] Act, this Virtual Visit was conducted with patient's (and/or legal guardian's) consent, to reduce the patient's risk of exposure to COVID-19 and provide necessary medical care. The patient (and/or legal guardian) has also been advised to contact this office for worsening conditions or problems, and seek emergency medical treatment and/or call 911 if deemed necessary. Patient identification was verified at the start of the visit: Yes    Total time spent for this encounter: 40 minutes    Services were provided through a video synchronous discussion virtually to substitute for in-person clinic visit. Patient and provider were located at their individual homes. --Jimmie Fraga MD on 10/27/2020 at 12:35 PM    An electronic signature was used to authenticate this note.             Jimmie Fraga MD

## 2020-10-29 LAB
ALBUMIN/GLOBULIN RATIO: 1.4 G/DL
ALBUMIN: 4.5 G/DL (ref 3.5–5)
ALP BLD-CCNC: 103 UNITS/L (ref 38–126)
ALT SERPL-CCNC: 77 UNITS/L (ref 4–35)
ANION GAP SERPL CALCULATED.3IONS-SCNC: 9.9 MMOL/L
AST SERPL-CCNC: 41 UNITS/L (ref 14–36)
BASOPHILS %: 0.99 (ref 0–3)
BASOPHILS ABSOLUTE: 0.05 (ref 0–0.3)
BILIRUB SERPL-MCNC: 0.4 MG/DL (ref 0.2–1.3)
BUN BLDV-MCNC: 14 MG/DL (ref 7–17)
CALCIUM SERPL-MCNC: 9.3 MG/DL (ref 8.4–10.2)
CHLORIDE BLD-SCNC: 104 MMOL/L (ref 98–120)
CHOLESTEROL/HDL RATIO: 5.5 RATIO (ref 0–4.5)
CHOLESTEROL: 176 MG/DL (ref 50–200)
CO2: 25 MMOL/L (ref 22–31)
CREAT SERPL-MCNC: 0.8 MG/DL (ref 0.5–1)
EOSINOPHILS %: 3 (ref 0–10)
EOSINOPHILS ABSOLUTE: 0.15 (ref 0–1.1)
GFR CALCULATED: > 60
GLOBULIN: 3.2 G/DL
GLUCOSE: 96 MG/DL (ref 65–105)
HCT VFR BLD CALC: 41 % (ref 37–47)
HDLC SERPL-MCNC: 32 MG/DL (ref 36–68)
HEMOGLOBIN: 12.9 (ref 12–16)
IRON SATURATION: 11 % (ref 15–38)
IRON: 47 MG/DL (ref 37–170)
LDL CHOLESTEROL CALCULATED: 93 MG/DL (ref 0–160)
LYMPHOCYTE %: 35.22 (ref 20–51.1)
LYMPHOCYTES ABSOLUTE: 1.78 (ref 1–5.5)
MCH RBC QN AUTO: 25.3 PG (ref 28.5–32.5)
MCHC RBC AUTO-ENTMCNC: 31.4 G/DL (ref 32–37)
MCV RBC AUTO: 80.8 FL (ref 80–94)
MONOCYTES %: 9.51 (ref 1.7–9.3)
MONOCYTES ABSOLUTE: 0.48 (ref 0.1–1)
NEUTROPHILS %: 51.27 (ref 42.2–75.2)
NEUTROPHILS ABSOLUTE: 2.59 (ref 2–8.1)
PDW BLD-RTO: 13.7 % (ref 8.5–15.5)
PLATELET # BLD: 255.2 THOU/MM3 (ref 130–400)
POTASSIUM SERPL-SCNC: 4.2 MMOL/L (ref 3.6–5)
RBC: 5.08 M/UL (ref 4.2–5.4)
SODIUM BLD-SCNC: 139 MMOL/L (ref 135–145)
T3 FREE: 4.79 PG/ML (ref 2.02–4.43)
T4 FREE: 1.41 NG/DL (ref 0.78–2.19)
TOTAL IRON BINDING CAPACITY: 435 MG/DL (ref 261–497)
TOTAL PROTEIN, SERUM: 7.6 G/DL (ref 6.3–8.2)
TRIGL SERPL-MCNC: 255 MG/DL (ref 10–250)
TSH SERPL DL<=0.05 MIU/L-ACNC: <0.02 MIU/ML (ref 0.49–4.67)
VITAMIN B-12: 988 PG/ML (ref 239–931)
VLDLC SERPL CALC-MCNC: 51 MG/DL (ref 0–50)
WBC: 5.1 THOU/ML3 (ref 4.8–10.8)

## 2020-11-19 RX ORDER — LIOTHYRONINE SODIUM 5 UG/1
TABLET ORAL
Qty: 90 TABLET | Refills: 3 | Status: SHIPPED | OUTPATIENT
Start: 2020-11-19 | End: 2022-03-10 | Stop reason: SDUPTHER

## 2020-11-19 NOTE — TELEPHONE ENCOUNTER
Yolie Schwartz is requesting a refill on the following medication(s):  Requested Prescriptions     Pending Prescriptions Disp Refills    liothyronine (CYTOMEL) 5 MCG tablet [Pharmacy Med Name: LIOTHYRONINE SODIUM TABS 5MCG] 90 tablet 3     Sig: TAKE 1 TABLET DAILY       Last Visit Date (If Applicable):  07/67/6198    Next Visit Date:    Visit date not found

## 2021-04-15 ENCOUNTER — OFFICE VISIT (OUTPATIENT)
Dept: FAMILY MEDICINE CLINIC | Age: 46
End: 2021-04-15
Payer: COMMERCIAL

## 2021-04-15 VITALS
OXYGEN SATURATION: 97 % | TEMPERATURE: 98.5 F | HEIGHT: 66 IN | WEIGHT: 208.8 LBS | SYSTOLIC BLOOD PRESSURE: 138 MMHG | HEART RATE: 122 BPM | DIASTOLIC BLOOD PRESSURE: 84 MMHG | RESPIRATION RATE: 16 BRPM | BODY MASS INDEX: 33.56 KG/M2

## 2021-04-15 DIAGNOSIS — Z00.00 WELLNESS EXAMINATION: Primary | ICD-10-CM

## 2021-04-15 DIAGNOSIS — L21.9 SEBORRHEIC DERMATITIS: ICD-10-CM

## 2021-04-15 DIAGNOSIS — E89.0 POSTOPERATIVE HYPOTHYROIDISM: ICD-10-CM

## 2021-04-15 PROCEDURE — 99396 PREV VISIT EST AGE 40-64: CPT | Performed by: FAMILY MEDICINE

## 2021-04-15 RX ORDER — METOPROLOL TARTRATE 50 MG/1
50 TABLET, FILM COATED ORAL 2 TIMES DAILY
Qty: 60 TABLET | Refills: 5 | Status: SHIPPED | OUTPATIENT
Start: 2021-04-15 | End: 2021-04-19 | Stop reason: SDUPTHER

## 2021-04-15 RX ORDER — VITAMIN B COMPLEX
1 CAPSULE ORAL DAILY
COMMUNITY

## 2021-04-15 SDOH — ECONOMIC STABILITY: FOOD INSECURITY: WITHIN THE PAST 12 MONTHS, YOU WORRIED THAT YOUR FOOD WOULD RUN OUT BEFORE YOU GOT MONEY TO BUY MORE.: NEVER TRUE

## 2021-04-15 SDOH — ECONOMIC STABILITY: TRANSPORTATION INSECURITY
IN THE PAST 12 MONTHS, HAS THE LACK OF TRANSPORTATION KEPT YOU FROM MEDICAL APPOINTMENTS OR FROM GETTING MEDICATIONS?: NOT ASKED

## 2021-04-15 ASSESSMENT — ENCOUNTER SYMPTOMS
GASTROINTESTINAL NEGATIVE: 1
RESPIRATORY NEGATIVE: 1

## 2021-04-15 ASSESSMENT — PATIENT HEALTH QUESTIONNAIRE - PHQ9
SUM OF ALL RESPONSES TO PHQ QUESTIONS 1-9: 0
SUM OF ALL RESPONSES TO PHQ9 QUESTIONS 1 & 2: 0

## 2021-04-15 NOTE — PROGRESS NOTES
4/15/2021    Laure Valdovinos (:  1975) is a 51 y.o. female, here for a preventive medicine evaluation. Patient Active Problem List   Diagnosis    Hypothyroid    Thyroid cancer ([facility] [address]    Non morbid obesity due to excess calories    Chronic vasomotor rhinitis       Review of Systems   Constitutional: Positive for fatigue. HENT: Negative. Respiratory: Negative. Cardiovascular: Negative. Gastrointestinal: Negative. Genitourinary: Negative. Musculoskeletal: Positive for arthralgias. Skin: Positive for rash (scalp). Has a lump under left armpit that has been there on and off for the past several years   Neurological: Negative. Hematological: Negative. Psychiatric/Behavioral: Positive for sleep disturbance. Prior to Visit Medications    Medication Sig Taking? Authorizing Provider   b complex vitamins capsule Take 1 capsule by mouth daily Yes Historical Provider, MD   VITAMIN A PO Take by mouth Yes Historical Provider, MD   liothyronine (CYTOMEL) 5 MCG tablet TAKE 1 TABLET DAILY Yes Silvana Cordova MD   Cholecalciferol (VITAMIN D3) 50 MCG ( UT) CAPS Take by mouth Yes Historical Provider, MD   Ascorbic Acid (VITAMIN C) 250 MG tablet Take 250 mg by mouth daily Yes Historical Provider, MD   clobetasol (TEMOVATE) 0.05 % external solution Apply topically 2 times daily.  Yes Silvana Cordova MD   levothyroxine (SYNTHROID) 150 MCG tablet Take 1 tablet by mouth Daily Yes Silvana Cordova MD   albuterol sulfate HFA (PROVENTIL HFA) 108 (90 Base) MCG/ACT inhaler Inhale 2 puffs into the lungs every 6 hours as needed for Wheezing Any albuterol inhaler ok to dispense Yes Silvana Cordova MD   MAGNESIUM-ZINC PO Take by mouth Also has calcium Yes Historical Provider, MD        Allergies   Allergen Reactions    Amoxicillin        Past Medical History:   Diagnosis Date    Hashimoto's thyroiditis     Irregular menses     history of     Pilar cyst     Skin lesion of scalp     Skin tag     great-grandmother    Diabetes Maternal Aunt     Diabetes Paternal Aunt     Other Daughter         reactive airway disorder        ADVANCE DIRECTIVE: N, <no information>    Vitals:    04/15/21 1432   BP: 138/84   Site: Right Upper Arm   Position: Sitting   Cuff Size: Large Adult   Pulse: 122   Resp: 16   Temp: 98.5 °F (36.9 °C)   SpO2: 97%   Weight: 208 lb 12.8 oz (94.7 kg)   Height: 5' 5.75\" (1.67 m)     Estimated body mass index is 33.96 kg/m² as calculated from the following:    Height as of this encounter: 5' 5.75\" (1.67 m). Weight as of this encounter: 208 lb 12.8 oz (94.7 kg). Physical Exam  Vitals signs and nursing note reviewed. Constitutional:       Appearance: Normal appearance. HENT:      Head: Normocephalic and atraumatic. Right Ear: Tympanic membrane normal.      Left Ear: Tympanic membrane normal.      Nose: Nose normal.      Mouth/Throat:      Mouth: Mucous membranes are moist.      Pharynx: No posterior oropharyngeal erythema. Eyes:      Extraocular Movements: Extraocular movements intact. Pupils: Pupils are equal, round, and reactive to light. Cardiovascular:      Rate and Rhythm: Regular rhythm. Tachycardia present. Heart sounds: No murmur. Pulmonary:      Effort: Pulmonary effort is normal.      Breath sounds: Normal breath sounds. Abdominal:      Palpations: Abdomen is soft. There is no mass. Tenderness: There is no abdominal tenderness. There is no guarding. Musculoskeletal:      Right lower leg: No edema. Left lower leg: No edema. Lymphadenopathy:      Head:      Right side of head: No submental, submandibular, tonsillar, preauricular, posterior auricular or occipital adenopathy. Left side of head: No submental, submandibular, tonsillar, preauricular, posterior auricular or occipital adenopathy. Cervical: No cervical adenopathy. Upper Body:      Right upper body: No supraclavicular, axillary or epitrochlear adenopathy.       Left upper body: No supraclavicular, axillary or epitrochlear adenopathy. Lower Body: No right inguinal adenopathy. No left inguinal adenopathy. Comments: There is a small lesion left anterior axilla    Skin:     General: Skin is warm and dry. Comments: Has some inflammation on scalp some thickening has some inflammation in both hears    Neurological:      General: No focal deficit present. Mental Status: She is alert and oriented to person, place, and time. Psychiatric:         Mood and Affect: Mood normal.         Behavior: Behavior normal.         No flowsheet data found.     Lab Results   Component Value Date    CHOL 176 10/29/2020    CHOL 164 04/29/2019    CHOL 168 10/29/2016    TRIG 255 10/29/2020    TRIG 218 04/29/2019    TRIG 168 10/29/2016    HDL 32 10/29/2020    HDL 37 10/29/2016    HDL 38 09/29/2015    LDLCHOLESTEROL 97 10/29/2016    LDLCHOLESTEROL 117 09/29/2015    LDLCALC 93.0 10/29/2020    LDLCALC 82.4 04/29/2019    GLUCOSE 96 10/29/2020       The 10-year ASCVD risk score (Cheryl Barber et al., 2013) is: 2%    Values used to calculate the score:      Age: 51 years      Sex: Female      Is Non- : No      Diabetic: No      Tobacco smoker: No      Systolic Blood Pressure: 249 mmHg      Is BP treated: No      HDL Cholesterol: 32 mg/dL      Total Cholesterol: 176 mg/dL    Immunization History   Administered Date(s) Administered    Influenza Virus Vaccine 11/20/2014, 09/25/2015, 11/17/2018    Influenza, Quadv, IM, PF (6 mo and older Fluzone, Flulaval, Fluarix, and 3 yrs and older Afluria) 10/07/2016    Tetanus 07/15/1993       Health Maintenance   Topic Date Due    Hepatitis C screen  Never done    HIV screen  Never done    COVID-19 Vaccine (1) Never done    DTaP/Tdap/Td vaccine (1 - Tdap) Never done    Cervical cancer screen  08/25/2017    Flu vaccine (Season Ended) 09/01/2021    TSH testing  10/29/2021    Lipid screen  10/29/2025    Hepatitis A vaccine Aged Out    Hepatitis B vaccine  Aged Out    Hib vaccine  Aged Out    Meningococcal (ACWY) vaccine  Aged Out    Pneumococcal 0-64 years Vaccine  Aged Out       ASSESSMENT/PLAN:  Patient Active Problem List   Diagnosis    Hypothyroid    [facility]roid cancer ([facility] [address]    Non morbid obesity due to excess calories    Chronic vasomotor rhinitis   tachycardia  Seborrhea dermatitis vs psoriasis scalp      covid vaccines to be started  Mammogram has been ordered not done yet  Had covid this past November and potential covid vaccine reaction reviewed  Discussed her tachycardia and should stop her cytomel to see if improves offered ekg but will hold until can start beta blocker (she is hyperthyroid iatrogenic) because of her thyroid cancer  Also offered endo evaluation  Derm consult recommend for scalp issues  To continue to work on healthy diet and fitness  To bring in advanced directives  The lesion in axilla can check ultrasound vs general surgery consult for opinion she will let me know               An electronic signature was used to authenticate this note.     --Shayne Orourke MD on 4/15/2021 at 2:41 PM

## 2021-04-15 NOTE — PATIENT INSTRUCTIONS
Patient Education        Walking for Exercise: Care Instructions  Your Care Instructions     Walking is one of the easiest ways to get the exercise you need for good health. A brisk, 30-minute walk each day can help you feel better and have more energy. It can help you lower your risk of disease. Walking can help you keep your bones strong and your heart healthy. Check with your doctor before you start a walking plan if you have heart problems, other health issues, or you have not been active in a long time. Follow your doctor's instructions for safe levels of exercise. Follow-up care is a key part of your treatment and safety. Be sure to make and go to all appointments, and call your doctor if you are having problems. It's also a good idea to know your test results and keep a list of the medicines you take. How can you care for yourself at home? Getting started  · Start slowly and set a short-term goal. For example, walk for 5 or 10 minutes every day. · Bit by bit, increase the amount you walk every day. Try for at least 30 minutes on most days of the week. You also may want to swim, bike, or do other activities. · If finding enough time is a problem, it's fine to be active in shorter periods of time throughout your day. · To get the heart-healthy benefits of walking, you need to walk briskly enough to increase your heart rate and breathing, but not so fast that you can't talk comfortably. · Wear comfortable shoes that fit well and provide good support for your feet and ankles. Staying with your plan  · After you've made walking a habit, set a longer-term goal. You may want to set a goal of walking briskly for longer or walking farther. Experts say to do 2½ hours (150 minutes) of moderate activity a week. A faster heartbeat is what defines moderate-level activity. · To stay motivated, walk with friends, coworkers, or pets. · Use a phone eber or pedometer to track your steps each day.  Set a goal to increase your steps. When you reach that goal, set a higher goal.  · If the weather keeps you from walking outside, go for walks at the mall with a friend. Local schools and churches may have indoor gyms where you can walk. Fitting a walk into your workday  · Park several blocks away from work, or get off the bus a few stops early. · Use the stairs instead of the elevator, at least for a few floors. · Suggest holding meetings with colleagues during a walk inside or outside the building. · Use the restroom that is the farthest from your desk or workstation. · Use your morning and afternoon breaks to take quick 15 minutes walks. Staying safe  · Know your surroundings. Walk in a well-lighted, safe place. If it's dark, walk with a partner. Wear light-colored clothing. If you can, buy a vest or jacket that reflects light. · Carry a cell phone for emergencies. · Drink plenty of water. Take a water bottle with you when you walk. This is very important if it is hot out. · Be careful not to slip on wet or icy ground. You can buy \"grippers\" for your shoes to help keep you from slipping. · Pay attention to your walking surface. Use sidewalks and paths. · If you have health issues such as asthma, COPD, or heart problems, or if you haven't been active for a long time, check with your doctor before you start a new activity. Where can you learn more? Go to [email or web address] and sign in to your [employer] account. Enter R159 in the [facility] box to learn more about \"Walking for Exercise: Care Instructions. \"     If you do not have an account, please click on the \"Sign Up Now\" link. Current as of: September 10, 2020               Content Version: 12.8  © 4214-0426 Healthwise, [employer]. Care instructions adapted under license by [facility] ([facility]).  If you have questions about a medical condition or this instruction, always ask your healthcare professional. Gil Vaz

## 2021-04-19 RX ORDER — METOPROLOL TARTRATE 50 MG/1
50 TABLET, FILM COATED ORAL 2 TIMES DAILY
Qty: 180 TABLET | Refills: 3 | Status: SHIPPED | OUTPATIENT
Start: 2021-04-19 | End: 2021-08-20 | Stop reason: SDUPTHER

## 2021-04-19 RX ORDER — METOPROLOL TARTRATE 50 MG/1
50 TABLET, FILM COATED ORAL 2 TIMES DAILY
Qty: 60 TABLET | Refills: 5 | Status: SHIPPED | OUTPATIENT
Start: 2021-04-19 | End: 2021-04-19 | Stop reason: SDUPTHER

## 2021-04-19 NOTE — TELEPHONE ENCOUNTER
Rosita Bhatia is requesting a refill on the following medication(s):  Requested Prescriptions     Pending Prescriptions Disp Refills    metoprolol tartrate (LOPRESSOR) 50 MG tablet 60 tablet 5     Sig: Take 1 tablet by mouth 2 times daily       Last Visit Date (If Applicable):  8/13/4468    Next Visit Date:    Visit date not found

## 2021-08-18 NOTE — TELEPHONE ENCOUNTER
Sahil Mc is requesting a refill on the following medication(s):  Requested Prescriptions     Pending Prescriptions Disp Refills    metoprolol tartrate (LOPRESSOR) 50 MG tablet 180 tablet 3     Sig: Take 1 tablet by mouth 2 times daily       Last Visit Date (If Applicable):  5/22/3374    Next Visit Date:    Visit date not found

## 2021-08-20 RX ORDER — METOPROLOL TARTRATE 50 MG/1
50 TABLET, FILM COATED ORAL 2 TIMES DAILY
Qty: 60 TABLET | Refills: 1 | Status: SHIPPED | OUTPATIENT
Start: 2021-08-20 | End: 2022-01-10 | Stop reason: SDUPTHER

## 2021-09-17 LAB
ALBUMIN/GLOBULIN RATIO: 1.7 G/DL
ALBUMIN: 4.6 G/DL (ref 3.5–5)
ALP BLD-CCNC: 82 UNITS/L (ref 38–126)
ALT SERPL-CCNC: 20 UNITS/L (ref 4–35)
ANION GAP SERPL CALCULATED.3IONS-SCNC: 9.4 MMOL/L
AST SERPL-CCNC: 20 UNITS/L (ref 14–36)
BILIRUB SERPL-MCNC: 0.4 MG/DL (ref 0.2–1.3)
BUN BLDV-MCNC: 14 MG/DL (ref 7–17)
CALCIUM SERPL-MCNC: 9.8 MG/DL (ref 8.4–10.2)
CHLORIDE BLD-SCNC: 104 MMOL/L (ref 98–120)
CO2: 26 MMOL/L (ref 22–31)
CREAT SERPL-MCNC: 0.8 MG/DL (ref 0.5–1)
GFR CALCULATED: > 60
GLOBULIN: 2.7 G/DL
GLUCOSE: 111 MG/DL (ref 65–105)
IRON SATURATION: 7 % (ref 15–38)
IRON: 31 MG/DL (ref 37–170)
POTASSIUM SERPL-SCNC: 4.2 MMOL/L (ref 3.6–5)
SODIUM BLD-SCNC: 140 MMOL/L (ref 135–145)
T4 FREE: 1.71 NG/DL (ref 0.78–2.19)
TOTAL IRON BINDING CAPACITY: 444 MG/DL (ref 261–497)
TOTAL PROTEIN, SERUM: 7.3 G/DL (ref 6.3–8.2)
TSH SERPL DL<=0.05 MIU/L-ACNC: <0.02 MIU/ML (ref 0.49–4.67)
VITAMIN D 25-HYDROXY: 42.5 NG/ML (ref 30–100)

## 2021-09-18 LAB
COLLECTION INFORMATION: NORMAL
INSULIN: 24.3 MU/L
REFERENCE RANGE: NORMAL
T3 FREE: 3.43 PG/ML (ref 2.02–4.43)
THYROGLOBULIN AB: <12 IU/ML (ref 0–40)

## 2021-09-20 LAB — THYROGLOBULIN: <0.2 NG/ML (ref 0–63.4)

## 2021-09-23 LAB — CORTISOL SALIVARY: 0.04

## 2021-10-14 DIAGNOSIS — E89.0 POSTOPERATIVE HYPOTHYROIDISM: ICD-10-CM

## 2021-10-14 NOTE — TELEPHONE ENCOUNTER
Left voicemail for patient to schedule appointment to get acquainted with new provider.      [facility] is requesting a refill on the following medication(s):  Requested Prescriptions     Pending Prescriptions Disp Refills    levothyroxine (SYNTHROID) 150 MCG tablet 90 tablet 1     Sig: Take 1 tablet by mouth Daily       Last Visit Date (If Applicable):  5/46/8436    Next Visit Date:    Visit date not found

## 2021-10-15 RX ORDER — LEVOTHYROXINE SODIUM 0.15 MG/1
150 TABLET ORAL DAILY
Qty: 90 TABLET | Refills: 1 | Status: SHIPPED | OUTPATIENT
Start: 2021-10-15 | End: 2022-01-10 | Stop reason: SDUPTHER

## 2021-10-25 ENCOUNTER — TELEPHONE (OUTPATIENT)
Dept: FAMILY MEDICINE CLINIC | Age: 46
End: 2021-10-25

## 2021-10-25 NOTE — TELEPHONE ENCOUNTER
Patient called back. She plans on donating plasma and they want to know when her thyroid surgery was etc. She is not going anywhere else, her records are here. Left message for patient to let us know who will be her new pcp since Dr Javier Calvillo has retired. We have paperwork from [facility] to fill out. Pt last seen 4/15/2021 by Dr Javier Calvillo.

## 2022-01-10 ENCOUNTER — OFFICE VISIT (OUTPATIENT)
Dept: FAMILY MEDICINE CLINIC | Age: 47
End: 2022-01-10
Payer: COMMERCIAL

## 2022-01-10 VITALS
SYSTOLIC BLOOD PRESSURE: 122 MMHG | HEIGHT: 66 IN | RESPIRATION RATE: 20 BRPM | WEIGHT: 210.2 LBS | BODY MASS INDEX: 33.78 KG/M2 | HEART RATE: 69 BPM | TEMPERATURE: 98.9 F | OXYGEN SATURATION: 97 % | DIASTOLIC BLOOD PRESSURE: 78 MMHG

## 2022-01-10 DIAGNOSIS — L70.0 ACNE VULGARIS: ICD-10-CM

## 2022-01-10 DIAGNOSIS — Z23 NEED FOR INFLUENZA VACCINATION: Primary | ICD-10-CM

## 2022-01-10 DIAGNOSIS — L21.9 SEBORRHEIC DERMATITIS: ICD-10-CM

## 2022-01-10 DIAGNOSIS — Z00.00 WELLNESS EXAMINATION: ICD-10-CM

## 2022-01-10 DIAGNOSIS — E89.0 POSTOPERATIVE HYPOTHYROIDISM: ICD-10-CM

## 2022-01-10 PROCEDURE — 90471 IMMUNIZATION ADMIN: CPT | Performed by: NURSE PRACTITIONER

## 2022-01-10 PROCEDURE — 90674 CCIIV4 VAC NO PRSV 0.5 ML IM: CPT | Performed by: NURSE PRACTITIONER

## 2022-01-10 PROCEDURE — 99213 OFFICE O/P EST LOW 20 MIN: CPT | Performed by: NURSE PRACTITIONER

## 2022-01-10 RX ORDER — LEVOTHYROXINE SODIUM 0.15 MG/1
150 TABLET ORAL DAILY
Qty: 90 TABLET | Refills: 1 | Status: SHIPPED | OUTPATIENT
Start: 2022-01-10 | End: 2022-06-20 | Stop reason: SDUPTHER

## 2022-01-10 RX ORDER — METOPROLOL TARTRATE 50 MG/1
50 TABLET, FILM COATED ORAL 2 TIMES DAILY
Qty: 180 TABLET | Refills: 3 | Status: SHIPPED | OUTPATIENT
Start: 2022-01-10

## 2022-01-10 RX ORDER — KETOCONAZOLE 20 MG/ML
SHAMPOO TOPICAL
COMMUNITY
Start: 2021-11-29

## 2022-01-10 SDOH — ECONOMIC STABILITY: FOOD INSECURITY: WITHIN THE PAST 12 MONTHS, YOU WORRIED THAT YOUR FOOD WOULD RUN OUT BEFORE YOU GOT MONEY TO BUY MORE.: NEVER TRUE

## 2022-01-10 SDOH — ECONOMIC STABILITY: FOOD INSECURITY: WITHIN THE PAST 12 MONTHS, THE FOOD YOU BOUGHT JUST DIDN'T LAST AND YOU DIDN'T HAVE MONEY TO GET MORE.: NEVER TRUE

## 2022-01-10 ASSESSMENT — ENCOUNTER SYMPTOMS
ABDOMINAL PAIN: 0
SHORTNESS OF BREATH: 0
WHEEZING: 0
VOMITING: 0
NAUSEA: 0
VOICE CHANGE: 0
COUGH: 0

## 2022-01-10 ASSESSMENT — SOCIAL DETERMINANTS OF HEALTH (SDOH): HOW HARD IS IT FOR YOU TO PAY FOR THE VERY BASICS LIKE FOOD, HOUSING, MEDICAL CARE, AND HEATING?: NOT HARD AT ALL

## 2022-01-10 ASSESSMENT — PATIENT HEALTH QUESTIONNAIRE - PHQ9
SUM OF ALL RESPONSES TO PHQ QUESTIONS 1-9: 0
SUM OF ALL RESPONSES TO PHQ9 QUESTIONS 1 & 2: 0
1. LITTLE INTEREST OR PLEASURE IN DOING THINGS: 0
2. FEELING DOWN, DEPRESSED OR HOPELESS: 0
SUM OF ALL RESPONSES TO PHQ QUESTIONS 1-9: 0

## 2022-01-10 NOTE — PROGRESS NOTES
chills, fatigue and fever. HENT: Negative for congestion and voice change. Respiratory: Negative for cough, shortness of breath and wheezing. Cardiovascular: Negative for chest pain, palpitations and leg swelling. Gastrointestinal: Negative for abdominal pain, nausea and vomiting. Genitourinary: Negative for difficulty urinating. Neurological: Negative for dizziness, weakness and headaches. Objective:     /78 (Site: Right Upper Arm, Position: Sitting, Cuff Size: Large Adult)   Pulse 69   Temp 98.9 °F (37.2 °C)   Resp 20   Ht 5' 6.3\" (1.684 m)   Wt 210 lb 3.2 oz (95.3 kg)   SpO2 97%   BMI 33.62 kg/m²     Physical Exam  Vitals and nursing note reviewed. Constitutional:       Appearance: Normal appearance. Cardiovascular:      Rate and Rhythm: Normal rate and regular rhythm. Heart sounds: S1 normal and S2 normal. No murmur heard. Pulmonary:      Effort: Pulmonary effort is normal.      Breath sounds: Normal breath sounds. No wheezing or rales. Abdominal:      General: Bowel sounds are normal.      Palpations: Abdomen is soft. Musculoskeletal:      Right lower leg: No edema. Left lower leg: No edema. Skin:     General: Skin is warm. Capillary Refill: Capillary refill takes less than 2 seconds. Neurological:      General: No focal deficit present. Mental Status: She is alert. Psychiatric:         Attention and Perception: Attention normal.         Mood and Affect: Mood normal.         Behavior: Behavior normal. Behavior is cooperative. Thought Content:  Thought content normal.         Judgment: Judgment normal.         Labs Reviewed 1/10/2022:    Lab Results   Component Value Date    WBC 5.1 10/29/2020    HGB 12.9 10/29/2020    HCT 41.0 10/29/2020    .2 10/29/2020    CHOL 176 10/29/2020    TRIG 255 (H) 10/29/2020    HDL 32 (L) 10/29/2020    ALT 20 09/17/2021    AST 20 09/17/2021     09/17/2021    K 4.2 09/17/2021     09/17/2021    CREATININE 0.8 09/17/2021    BUN 14 09/17/2021    CO2 26 09/17/2021    TSH <0.02 (L) 09/17/2021       Assessment/Plan      1. Postoperative hypothyroidism  Reestablish with endocrinology of choice  - levothyroxine (SYNTHROID) 150 MCG tablet; Take 1 tablet by mouth Daily  Dispense: 90 tablet; Refill: 1    2. Need for influenza vaccination    - INFLUENZA, MDCK QUADV, 2 YRS AND OLDER, IM, PF, PREFILL SYR OR SDV, 0.5ML (FLUCELVAX QUADV, PF)      To schedule wellness exam and labs to see pharmacist   - Estrogens, Fractionated; Future  - Follicle Stimulating Hormone; Future  - Luteinizing Hormone; Future  - Testosterone, Free; Future  - CBC With Auto Differential; Future  - Comprehensive Metabolic Panel; Future   Seborrheic dermatitis  Continue with dermatology       Return in about 6 months (around 7/10/2022). Patient given educational materials - see patient instructions. Discussed use, benefit, and side effects of prescribed medications. All patient questions answered. Pt voiced understanding. Reviewed health maintenance.        Electronically signed JAXON Krueger - CNP on 1/10/2022 at 2:33 PM

## 2022-01-11 RX ORDER — TRETINOIN 0.5 MG/G
CREAM TOPICAL
Qty: 45 G | Refills: 1 | Status: SHIPPED | OUTPATIENT
Start: 2022-01-11 | End: 2022-02-10

## 2022-01-13 RX ORDER — ALBUTEROL SULFATE 90 UG/1
2 AEROSOL, METERED RESPIRATORY (INHALATION) EVERY 6 HOURS PRN
Qty: 1 EACH | Refills: 2 | Status: SHIPPED | OUTPATIENT
Start: 2022-01-13

## 2022-01-17 NOTE — TELEPHONE ENCOUNTER
Vandana Damian is requesting a refill on the following medication(s):  Requested Prescriptions     Pending Prescriptions Disp Refills    clobetasol (TEMOVATE) 0.05 % external solution 300 mL 3     Sig: Apply topically 2 times daily.        Last Visit Date (If Applicable):  3/52/7151    Next Visit Date:    Visit date not found

## 2022-01-19 LAB
ALBUMIN/GLOBULIN RATIO: 1.7 G/DL
ALBUMIN: 4.4 G/DL (ref 3.5–5)
ALP BLD-CCNC: 72 UNITS/L (ref 38–126)
ALT SERPL-CCNC: 25 UNITS/L (ref 4–35)
ANION GAP SERPL CALCULATED.3IONS-SCNC: 6 MMOL/L
AST SERPL-CCNC: 21 UNITS/L (ref 14–36)
BASOPHILS %: 1.31 (ref 0–3)
BASOPHILS ABSOLUTE: 0.11 (ref 0–0.3)
BILIRUB SERPL-MCNC: 0.6 MG/DL (ref 0.2–1.3)
BUN BLDV-MCNC: 13 MG/DL (ref 7–17)
CALCIUM SERPL-MCNC: 9.7 MG/DL (ref 8.4–10.2)
CHLORIDE BLD-SCNC: 106 MMOL/L (ref 98–120)
CO2: 27 MMOL/L (ref 22–31)
CREAT SERPL-MCNC: 0.7 MG/DL (ref 0.5–1)
EOSINOPHILS %: 3.48 (ref 0–10)
EOSINOPHILS ABSOLUTE: 0.28 (ref 0–1.1)
GFR CALCULATED: > 60
GLOBULIN: 2.6 G/DL
GLUCOSE: 109 MG/DL (ref 65–105)
HCT VFR BLD CALC: 40.5 % (ref 37–47)
HEMOGLOBIN: 13.3 (ref 12–16)
LYMPHOCYTE %: 33.06 (ref 20–51.1)
LYMPHOCYTES ABSOLUTE: 2.67 (ref 1–5.5)
MCH RBC QN AUTO: 27.7 PG (ref 28.5–32.5)
MCHC RBC AUTO-ENTMCNC: 32.9 G/DL (ref 32–37)
MCV RBC AUTO: 84.3 FL (ref 80–94)
MONOCYTES %: 7.39 (ref 1.7–9.3)
MONOCYTES ABSOLUTE: 0.6 (ref 0.1–1)
NEUTROPHILS %: 54.75 (ref 42.2–75.2)
NEUTROPHILS ABSOLUTE: 4.42 (ref 2–8.1)
PDW BLD-RTO: 11.4 % (ref 8.5–15.5)
PLATELET # BLD: 318.1 THOU/MM3 (ref 130–400)
POTASSIUM SERPL-SCNC: 4.1 MMOL/L (ref 3.6–5)
RBC: 4.8 M/UL (ref 4.2–5.4)
SODIUM BLD-SCNC: 138 MMOL/L (ref 135–145)
TOTAL PROTEIN, SERUM: 7.1 G/DL (ref 6.3–8.2)
WBC: 8.1 THOU/ML3 (ref 4.8–10.8)

## 2022-01-20 LAB
FOLLICLE STIMULATING HORMONE: 6.2 U/L (ref 1.7–21.5)
LUTEINIZING HORMONE: 13.9 U/L (ref 1–95.6)
SEX HORMONE BINDING GLOBULIN: 52 NMOL/L (ref 30–135)
TESTOSTERONE FREE: 5.6 PG/ML (ref 1.1–5.8)
TESTOSTERONE: 42 NG/DL (ref 20–70)

## 2022-01-20 RX ORDER — CLOBETASOL PROPIONATE 0.46 MG/ML
SOLUTION TOPICAL
Qty: 300 ML | Refills: 3 | Status: SHIPPED | OUTPATIENT
Start: 2022-01-20

## 2022-01-20 NOTE — TELEPHONE ENCOUNTER
Maia Zacarias is requesting a refill on the following medication(s):  Requested Prescriptions     Pending Prescriptions Disp Refills    clobetasol (TEMOVATE) 0.05 % external solution 300 mL 3     Sig: Apply topically 2 times daily.        Last Visit Date (If Applicable):  1/44/1955    Next Visit Date:    Visit date not found No

## 2022-01-23 LAB
ESTRADIOL LEVEL: 278.7
ESTROGEN TOTAL: 356.8
ESTRONE: 78.1

## 2022-01-24 DIAGNOSIS — Z00.00 WELLNESS EXAMINATION: ICD-10-CM

## 2022-01-24 RX ORDER — CLOBETASOL PROPIONATE 0.46 MG/ML
SOLUTION TOPICAL
Qty: 300 ML | Refills: 3 | Status: SHIPPED | OUTPATIENT
Start: 2022-01-24

## 2022-02-09 PROBLEM — Z00.00 WELLNESS EXAMINATION: Status: RESOLVED | Noted: 2022-01-10 | Resolved: 2022-02-09

## 2022-03-10 RX ORDER — LIOTHYRONINE SODIUM 5 UG/1
TABLET ORAL
Qty: 90 TABLET | Refills: 0 | Status: SHIPPED | OUTPATIENT
Start: 2022-03-10

## 2022-03-10 NOTE — TELEPHONE ENCOUNTER
Requested Prescriptions     Pending Prescriptions Disp Refills    liothyronine (CYTOMEL) 5 MCG tablet 90 tablet 3

## 2022-06-20 DIAGNOSIS — E89.0 POSTOPERATIVE HYPOTHYROIDISM: ICD-10-CM

## 2022-06-20 RX ORDER — LEVOTHYROXINE SODIUM 0.15 MG/1
150 TABLET ORAL DAILY
Qty: 30 TABLET | Refills: 0 | Status: SHIPPED | OUTPATIENT
Start: 2022-06-20 | End: 2022-10-13 | Stop reason: SDUPTHER

## 2022-06-20 NOTE — TELEPHONE ENCOUNTER
Pt also requesting refill of Metformin 500g that I could not find in her chart.  Pt does need a new pcp      Mitali Almaraz is requesting a refill on the following medication(s):  Requested Prescriptions     Pending Prescriptions Disp Refills    levothyroxine (SYNTHROID) 150 MCG tablet 90 tablet 1     Sig: Take 1 tablet by mouth Daily       Last Visit Date (If Applicable):  4/31/2799    Next Visit Date:    Visit date not found

## 2022-09-13 ENCOUNTER — OFFICE VISIT (OUTPATIENT)
Dept: FAMILY MEDICINE CLINIC | Age: 47
End: 2022-09-13
Payer: COMMERCIAL

## 2022-09-13 VITALS
WEIGHT: 204.8 LBS | HEIGHT: 66 IN | OXYGEN SATURATION: 99 % | RESPIRATION RATE: 16 BRPM | BODY MASS INDEX: 32.92 KG/M2 | HEART RATE: 81 BPM | SYSTOLIC BLOOD PRESSURE: 130 MMHG | DIASTOLIC BLOOD PRESSURE: 84 MMHG

## 2022-09-13 DIAGNOSIS — Z11.4 ENCOUNTER FOR SCREENING FOR HIV: ICD-10-CM

## 2022-09-13 DIAGNOSIS — Z11.59 ENCOUNTER FOR HEPATITIS C SCREENING TEST FOR LOW RISK PATIENT: ICD-10-CM

## 2022-09-13 DIAGNOSIS — B35.4 TINEA CORPORIS: ICD-10-CM

## 2022-09-13 DIAGNOSIS — E66.09 CLASS 1 OBESITY DUE TO EXCESS CALORIES WITHOUT SERIOUS COMORBIDITY WITH BODY MASS INDEX (BMI) OF 33.0 TO 33.9 IN ADULT: ICD-10-CM

## 2022-09-13 DIAGNOSIS — M25.50 ARTHRALGIA, UNSPECIFIED JOINT: ICD-10-CM

## 2022-09-13 DIAGNOSIS — E78.2 MIXED HYPERLIPIDEMIA: ICD-10-CM

## 2022-09-13 DIAGNOSIS — Z13.21 ENCOUNTER FOR VITAMIN DEFICIENCY SCREENING: ICD-10-CM

## 2022-09-13 DIAGNOSIS — R53.83 FATIGUE, UNSPECIFIED TYPE: ICD-10-CM

## 2022-09-13 DIAGNOSIS — E89.0 POSTOPERATIVE HYPOTHYROIDISM: Primary | ICD-10-CM

## 2022-09-13 DIAGNOSIS — Z12.11 SCREEN FOR COLON CANCER: ICD-10-CM

## 2022-09-13 DIAGNOSIS — N92.0 MENORRHAGIA WITH REGULAR CYCLE: ICD-10-CM

## 2022-09-13 DIAGNOSIS — Z00.00 WELLNESS EXAMINATION: ICD-10-CM

## 2022-09-13 DIAGNOSIS — R73.9 HYPERGLYCEMIA: ICD-10-CM

## 2022-09-13 LAB — HBA1C MFR BLD: 6 %

## 2022-09-13 PROCEDURE — 83036 HEMOGLOBIN GLYCOSYLATED A1C: CPT | Performed by: FAMILY MEDICINE

## 2022-09-13 PROCEDURE — 99215 OFFICE O/P EST HI 40 MIN: CPT | Performed by: FAMILY MEDICINE

## 2022-09-13 RX ORDER — KETOCONAZOLE 20 MG/G
CREAM TOPICAL
Qty: 30 G | Refills: 1 | Status: SHIPPED | OUTPATIENT
Start: 2022-09-13

## 2022-09-13 RX ORDER — FINASTERIDE 5 MG/1
5 TABLET, FILM COATED ORAL DAILY
COMMUNITY

## 2022-09-13 RX ORDER — METFORMIN HYDROCHLORIDE 500 MG/1
500 TABLET, EXTENDED RELEASE ORAL
Qty: 90 TABLET | Refills: 1 | Status: SHIPPED | OUTPATIENT
Start: 2022-09-13 | End: 2022-12-12

## 2022-09-13 RX ORDER — NALTREXONE HYDROCHLORIDE 50 MG/1
50 TABLET, FILM COATED ORAL DAILY
COMMUNITY

## 2022-09-13 RX ORDER — METFORMIN HYDROCHLORIDE 500 MG/1
TABLET, EXTENDED RELEASE ORAL
COMMUNITY
Start: 2022-06-24 | End: 2022-09-13 | Stop reason: SDUPTHER

## 2022-09-13 ASSESSMENT — PATIENT HEALTH QUESTIONNAIRE - PHQ9
SUM OF ALL RESPONSES TO PHQ QUESTIONS 1-9: 0
1. LITTLE INTEREST OR PLEASURE IN DOING THINGS: 0
2. FEELING DOWN, DEPRESSED OR HOPELESS: 0
SUM OF ALL RESPONSES TO PHQ9 QUESTIONS 1 & 2: 0
SUM OF ALL RESPONSES TO PHQ QUESTIONS 1-9: 0

## 2022-09-13 ASSESSMENT — ENCOUNTER SYMPTOMS
ABDOMINAL PAIN: 0
COUGH: 0
BACK PAIN: 0
BLOOD IN STOOL: 0
VOMITING: 0
CONSTIPATION: 0
CHEST TIGHTNESS: 0
CHOKING: 0
PHOTOPHOBIA: 0
NAUSEA: 0
WHEEZING: 0
SHORTNESS OF BREATH: 0
DIARRHEA: 0

## 2022-09-13 NOTE — PATIENT INSTRUCTIONS
Nutrition Health Education:    Keep hydrated, walk 30 minutes minimum 3 times weekly as tolerated. Diet should consist of low fat, low sodium and high fiber. Nutritious foods such as fruits (if you're not diabetic), vegetables, lean meats, lean dairy, whole grains such as brown rice, quinoa, and dry beans. Pratibha Dry with small amounts of heart healthy extra virgin olive oil. Be watchful of any extra salt/sugar/seasoning to your food. You should eat no more than 2 grams or 2,000 mg of salt daily. Salt will raise your BP. Avoid regular/diet sodas, caffeine, energy drinks as these are full of artificial ingredients/sweeteners, sugar, salt and chemicals that spike insulin and are harmful to your health. Sugar intake increases metabolic disfunction, type 2 diabetes, insulin resistance, addictive food behavior and obesity. Avoid all processed foods, foods from boxes, cans, microwave meals as these contain high salt, sugar or fat content and not much nutrition. Get at least 8 hrs of sleep every night and turn off all electronics at least 1 hour before bedtime as these decreases melatonin production and increases wakefulness. If your cholesterol is high, no greasy, fried, fast or fatty foods. Decrease red meat intake. No butter, brito, lard or creams, no milk as these things clog your arteries and leads to heart attacks and death. If you smoke, smoking increases risk of lung disease, cancers, high BP, heart attack, stroke and death. Take your daily medications as prescribed and inform your family doctor if you are having any side effects or issues taking medications.      Elevated Cholesterol:  No greasy, fried, fast, fatty foods  No trans fats  Decreased red meat intake to once every 2 months  No butter, brito nor cream cheese, cheese  No egg yolk  NO milk  Decrease your cholesterol in diet    Flu vaccine was given today no reaction in office  Ordered a pelvic ultrasound  Next week hopefully we will have th Dtap to give you  Refer to general surgery for screening colonoscopy    Fasting blood work this week and Pap smear next week

## 2022-09-13 NOTE — PROGRESS NOTES
breakfast) 90 tablet 1    levothyroxine (SYNTHROID) 150 MCG tablet Take 1 tablet by mouth Daily 30 tablet 0    liothyronine (CYTOMEL) 5 MCG tablet Indications: every other day TAKE 1 TABLET DAILY 90 tablet 0    clobetasol (TEMOVATE) 0.05 % external solution Apply topically 2 times daily. 300 mL 3    clobetasol (TEMOVATE) 0.05 % external solution Apply topically 2 times daily. 300 mL 3    albuterol sulfate HFA (PROVENTIL HFA) 108 (90 Base) MCG/ACT inhaler Inhale 2 puffs into the lungs every 6 hours as needed for Wheezing Any albuterol inhaler ok to dispense 1 each 2    ketoconazole (NIZORAL) 2 % shampoo SHAMPOO THREE TIMES WEEKLY TO SCALP AND RINSE OFF. Iron, Ferrous Sulfate, 325 (65 Fe) MG TABS Take by mouth      metoprolol tartrate (LOPRESSOR) 50 MG tablet Take 1 tablet by mouth 2 times daily 180 tablet 3    b complex vitamins capsule Take 1 capsule by mouth daily      VITAMIN A PO Take by mouth      Cholecalciferol (VITAMIN D3) 50 MCG (2000 UT) CAPS Take by mouth      MAGNESIUM-ZINC PO Take by mouth Also has calcium      Ascorbic Acid (VITAMIN C) 250 MG tablet Take 250 mg by mouth daily (Patient not taking: No sig reported)       No current facility-administered medications for this visit. Objective:  Pt is here to be established. Pt had thyroid cancer at 27 yoa. Pt is on metformin to lose weight which he did lose weight 10 lbs. Pt was offered a flu shot. She does not feel sick. Pt is going to [facility] to get her 4th Covid vaccine. Also she states she has joint pain for many years hips, hands, elbows, shoulders but not as much as the other sites. Pt also has a compounded drug naltrexone and finasteride daily. Sheis not sure that it is helping her    Review of Systems   Constitutional:  Positive for fatigue (always for years). Negative for unexpected weight change. Eyes:  Negative for photophobia and visual disturbance.    Respiratory:  Negative for cough, choking, chest tightness, shortness of breath and wheezing. Cardiovascular:  Negative for chest pain, palpitations and leg swelling. Gastrointestinal:  Negative for abdominal pain, blood in stool, constipation, diarrhea, nausea and vomiting. Genitourinary:  Negative for difficulty urinating, hematuria, vaginal bleeding, vaginal discharge and vaginal pain. Musculoskeletal:  Positive for arthralgias (every where for a long time) and neck pain (part of the whole joint pain thing she says, was told she has a lot of arthritis in her neck). Negative for back pain, gait problem and myalgias. Skin:  Positive for rash (onketoconazole lower abdomen showed up in july. ). Negative for wound. Neurological:  Negative for dizziness, weakness, light-headedness, numbness and headaches. Hematological:  Negative for adenopathy. Does not bruise/bleed easily. Psychiatric/Behavioral:  Positive for sleep disturbance (Pt wakes up in the middle of the night she takes niquil over the counter for this). Negative for agitation, confusion, decreased concentration, self-injury and suicidal ideas. The patient is not nervous/anxious. Physical Exam  Constitutional:       General: She is not in acute distress. Appearance: Normal appearance. She is obese. She is not ill-appearing, toxic-appearing or diaphoretic. HENT:      Head: Normocephalic and atraumatic. Right Ear: Tympanic membrane, ear canal and external ear normal. There is no impacted cerumen. Left Ear: Tympanic membrane, ear canal and external ear normal. There is no impacted cerumen. Nose: Nose normal. No congestion or rhinorrhea. Mouth/Throat:      Mouth: Mucous membranes are moist.      Pharynx: Oropharynx is clear. No oropharyngeal exudate or posterior oropharyngeal erythema. Eyes:      Extraocular Movements: Extraocular movements intact. Conjunctiva/sclera: Conjunctivae normal.      Pupils: Pupils are equal, round, and reactive to light.    Cardiovascular:      Rate and Rhythm: Normal rate and regular rhythm. Pulses: Normal pulses. Heart sounds: Normal heart sounds. No murmur heard. Pulmonary:      Effort: Pulmonary effort is normal.      Breath sounds: Normal breath sounds. No wheezing, rhonchi or rales. Abdominal:      General: Abdomen is flat. Bowel sounds are normal. There is no distension. Palpations: Abdomen is soft. There is no mass. Tenderness: There is no abdominal tenderness. There is no right CVA tenderness, left CVA tenderness or guarding. Musculoskeletal:         General: Normal range of motion. Cervical back: Normal range of motion and neck supple. Right lower leg: No edema. Left lower leg: No edema. Lymphadenopathy:      Cervical: No cervical adenopathy. Skin:     General: Skin is warm. Capillary Refill: Capillary refill takes less than 2 seconds. Findings: Rash (red irregulare boorder roundish flat scaley rash two spots left lower abdomen which itches) present. Neurological:      General: No focal deficit present. Mental Status: She is alert and oriented to person, place, and time. Motor: No weakness. Coordination: Coordination normal.      Gait: Gait normal.   Psychiatric:         Mood and Affect: Mood normal.         Behavior: Behavior normal.         Thought Content: Thought content normal.        Assessment:   Diagnosis Orders   1. Postoperative hypothyroidism  TSH with Reflex    T4, Free      2. Hyperglycemia  POCT glycosylated hemoglobin (Hb A1C)    Comprehensive Metabolic Panel    metFORMIN (GLUCOPHAGE-XR) 500 MG extended release tablet      3. Fatigue, unspecified type  CBC with Auto Differential    Comprehensive Metabolic Panel    TSH with Reflex    T4, Free    Vitamin D 25 Hydroxy      4. Mixed hyperlipidemia  Comprehensive Metabolic Panel    Lipid Panel      5. Arthralgia, unspecified joint  EMILY Screen With Reflex    C-Reactive Protein    Sedimentation Rate      6.  Tinea corporis  ketoconazole (NIZORAL) 2 % cream      7. Class 1 obesity due to excess calories without serious comorbidity with body mass index (BMI) of 33.0 to 33.9 in adult  metFORMIN (GLUCOPHAGE-XR) 500 MG extended release tablet      8. Menorrhagia with regular cycle  US PELVIS COMPLETE      9. Wellness examination  CBC with Auto Differential    Comprehensive Metabolic Panel    Lipid Panel    TSH with Reflex    T4, Free    Vitamin D 25 Hydroxy    HIV Screen    Hepatitis C Antibody      10. Encounter for vitamin deficiency screening  Vitamin D 25 Hydroxy      11. Encounter for hepatitis C screening test for low risk patient  Hepatitis C Antibody      12. Encounter for screening for HIV  HIV Screen      13.  Screen for colon cancer  Ambulatory referral to General Surgery            Plan:  Orders Placed This Encounter   Procedures    US PELVIS COMPLETE           Standing Status:   Future     Standing Expiration Date:   10/13/2022     Order Specific Question:   Reason for exam:     Answer:   menorrhagia    CBC with Auto Differential     Standing Status:   Future     Standing Expiration Date:   10/13/2022    Comprehensive Metabolic Panel     Standing Status:   Future     Standing Expiration Date:   10/13/2022    Lipid Panel     Standing Status:   Future     Standing Expiration Date:   10/13/2022     Order Specific Question:   Is Patient Fasting?/# of Hours     Answer:   Yes    TSH with Reflex     Standing Status:   Future     Standing Expiration Date:   10/13/2022    T4, Free     Standing Status:   Future     Standing Expiration Date:   10/13/2022    Vitamin D 25 Hydroxy     Standing Status:   Future     Standing Expiration Date:   10/13/2022    HIV Screen     Standing Status:   Future     Standing Expiration Date:   10/13/2022    Hepatitis C Antibody     Standing Status:   Future     Standing Expiration Date:   10/13/2022    EMILY Screen With Reflex     Standing Status:   Future     Standing Expiration Date:   10/13/2022    C-Reactive Protein Standing Status:   Future     Standing Expiration Date:   10/13/2022    Sedimentation Rate     Standing Status:   Future     Standing Expiration Date:   10/13/2022    Ambulatory referral to General Surgery     Referral Priority:   Routine     Referral Type:   Eval and Treat     Referral Reason:   Specialty Services Required     Referred to Provider:   Pallavi Hawkins DO     Requested Specialty:   General Surgery     Number of Visits Requested:   1    POCT glycosylated hemoglobin (Hb A1C)         Patient Instructions   Nutrition Health Education:    Keep hydrated, walk 30 minutes minimum 3 times weekly as tolerated. Diet should consist of low fat, low sodium and high fiber. Nutritious foods such as fruits (if you're not diabetic), vegetables, lean meats, lean dairy, whole grains such as brown rice, quinoa, and dry beans. [place] with small amounts of heart healthy extra virgin olive oil. Be watchful of any extra salt/sugar/seasoning to your food. You should eat no more than 2 grams or 2,000 mg of salt daily. Salt will raise your BP. Avoid regular/diet sodas, caffeine, energy drinks as these are full of artificial ingredients/sweeteners, sugar, salt and chemicals that spike insulin and are harmful to your health. Sugar intake increases metabolic disfunction, type 2 diabetes, insulin resistance, addictive food behavior and obesity. Avoid all processed foods, foods from boxes, cans, microwave meals as these contain high salt, sugar or fat content and not much nutrition. Get at least 8 hrs of sleep every night and turn off all electronics at least 1 hour before bedtime as these decreases melatonin production and increases wakefulness. If your cholesterol is high, no greasy, fried, fast or fatty foods. Decrease red meat intake. No butter, brito, lard or creams, no milk as these things clog your arteries and leads to heart attacks and death.  If you smoke, smoking increases risk of lung disease, cancers, high BP, heart attack, stroke and death. Take your daily medications as prescribed and inform your family doctor if you are having any side effects or issues taking medications. Elevated Cholesterol:  No greasy, fried, fast, fatty foods  No trans fats  Decreased red meat intake to once every 2 months  No butter, brito nor cream cheese, cheese  No egg yolk  NO milk  Decrease your cholesterol in diet    Flu vaccine was given today no reaction in office  Ordered a pelvic ultrasound  Next week hopefully we will have th Dtap to give you  Refer to general surgery for screening colonoscopy    Fasting blood work this week and Pap smear next week     Return in about 1 week (around 9/20/2022) for pap smear.      Ale Rank, DO

## 2022-09-14 ENCOUNTER — TELEPHONE (OUTPATIENT)
Dept: FAMILY MEDICINE CLINIC | Age: 47
End: 2022-09-14

## 2022-09-14 NOTE — TELEPHONE ENCOUNTER
----- Message from Sharri Rose sent at 9/13/2022 11:19 AM EDT -----  Subject: Message to Provider    QUESTIONS  Information for Provider? the pt called and stated that she received a   referral for a coloscopy from the pcp. She stated that due to her   insurance she will not be able to have a colonaspy until the first of 2023. she is asking to put the referral off until January 2023. if   questions contact the pt  ---------------------------------------------------------------------------  --------------  Javi GOMEZ  [phone]; OK to leave message on voicemail  ---------------------------------------------------------------------------  --------------  SCRIPT ANSWERS  Relationship to Patient?  Self

## 2022-09-15 ENCOUNTER — TELEPHONE (OUTPATIENT)
Dept: FAMILY MEDICINE CLINIC | Age: 47
End: 2022-09-15

## 2022-09-19 LAB
ALBUMIN/GLOBULIN RATIO: 1.6 G/DL
ALBUMIN: 4.5 G/DL (ref 3.5–5)
ALP BLD-CCNC: 83 UNITS/L (ref 38–126)
ALT SERPL-CCNC: 25 UNITS/L (ref 4–35)
ANION GAP SERPL CALCULATED.3IONS-SCNC: 8.9 MMOL/L
AST SERPL-CCNC: 19 UNITS/L (ref 14–36)
BASOPHILS %: 1.17 (ref 0–3)
BASOPHILS ABSOLUTE: 0.09 (ref 0–0.3)
BILIRUB SERPL-MCNC: 0.5 MG/DL (ref 0.2–1.3)
BUN BLDV-MCNC: 12 MG/DL (ref 7–17)
C-REACTIVE PROTEIN: < 0.5 MG/DL (ref 0–1)
CALCIUM SERPL-MCNC: 9.5 MG/DL (ref 8.4–10.2)
CHLORIDE BLD-SCNC: 104 MMOL/L (ref 98–120)
CHOLESTEROL/HDL RATIO: 4.84 RATIO (ref 0–4.5)
CHOLESTEROL: 184 MG/DL (ref 50–200)
CO2: 24 MMOL/L (ref 22–31)
CREAT SERPL-MCNC: 0.7 MG/DL (ref 0.5–1)
EOSINOPHILS %: 3.01 (ref 0–10)
EOSINOPHILS ABSOLUTE: 0.24 (ref 0–1.1)
GFR CALCULATED: > 60
GLOBULIN: 2.9 G/DL
GLUCOSE: 146 MG/DL (ref 65–105)
HCT VFR BLD CALC: 43.5 % (ref 37–47)
HDLC SERPL-MCNC: 38 MG/DL (ref 36–68)
HEMOGLOBIN: 13 (ref 12–16)
LDL CHOLESTEROL CALCULATED: 94.2 MG/DL (ref 0–160)
LYMPHOCYTE %: 27.96 (ref 20–51.1)
LYMPHOCYTES ABSOLUTE: 2.24 (ref 1–5.5)
MCH RBC QN AUTO: 27.2 PG (ref 28.5–32.5)
MCHC RBC AUTO-ENTMCNC: 29.9 G/DL (ref 32–37)
MCV RBC AUTO: 91 FL (ref 80–94)
MONOCYTES %: 7.26 (ref 1.7–9.3)
MONOCYTES ABSOLUTE: 0.58 (ref 0.1–1)
NEUTROPHILS %: 60.6 (ref 42.2–75.2)
NEUTROPHILS ABSOLUTE: 4.86 (ref 2–8.1)
PDW BLD-RTO: 13.4 % (ref 8.5–15.5)
PLATELET # BLD: 273.6 THOU/MM3 (ref 130–400)
POTASSIUM SERPL-SCNC: 4 MMOL/L (ref 3.6–5)
RBC: 4.78 M/UL (ref 4.2–5.4)
SEDIMENTATION RATE, ERYTHROCYTE: 14 MM/HR (ref 0–30)
SODIUM BLD-SCNC: 137 MMOL/L (ref 135–145)
T4 FREE: 1.95 NG/DL (ref 0.78–2.19)
TOTAL PROTEIN, SERUM: 7.4 G/DL (ref 6.3–8.2)
TRIGL SERPL-MCNC: 259 MG/DL (ref 10–250)
TSH SERPL DL<=0.05 MIU/L-ACNC: <0.02 MIU/ML (ref 0.49–4.67)
VITAMIN D 25-HYDROXY: 49.5 NG/ML (ref 30–100)
VLDLC SERPL CALC-MCNC: 51.8 MG/DL (ref 0–50)
WBC: 8 THOU/ML3 (ref 4.8–10.8)

## 2022-09-21 ENCOUNTER — OFFICE VISIT (OUTPATIENT)
Dept: FAMILY MEDICINE CLINIC | Age: 47
End: 2022-09-21
Payer: COMMERCIAL

## 2022-09-21 ENCOUNTER — TELEPHONE (OUTPATIENT)
Dept: FAMILY MEDICINE CLINIC | Age: 47
End: 2022-09-21

## 2022-09-21 ENCOUNTER — HOSPITAL ENCOUNTER (OUTPATIENT)
Age: 47
Setting detail: SPECIMEN
Discharge: HOME | End: 2022-09-21
Payer: COMMERCIAL

## 2022-09-21 VITALS
HEIGHT: 66 IN | BODY MASS INDEX: 32.5 KG/M2 | SYSTOLIC BLOOD PRESSURE: 124 MMHG | DIASTOLIC BLOOD PRESSURE: 86 MMHG | OXYGEN SATURATION: 96 % | WEIGHT: 202.2 LBS | HEART RATE: 86 BPM

## 2022-09-21 DIAGNOSIS — Z12.31 ENCOUNTER FOR SCREENING MAMMOGRAM FOR MALIGNANT NEOPLASM OF BREAST: ICD-10-CM

## 2022-09-21 DIAGNOSIS — Z12.4 PAP SMEAR FOR CERVICAL CANCER SCREENING: Primary | ICD-10-CM

## 2022-09-21 DIAGNOSIS — Z23 NEED FOR TETANUS BOOSTER: ICD-10-CM

## 2022-09-21 DIAGNOSIS — Z12.4 PAP SMEAR FOR CERVICAL CANCER SCREENING: ICD-10-CM

## 2022-09-21 DIAGNOSIS — Z23 NEED FOR INFLUENZA VACCINATION: ICD-10-CM

## 2022-09-21 DIAGNOSIS — E78.1 HYPERTRIGLYCERIDEMIA: ICD-10-CM

## 2022-09-21 DIAGNOSIS — N93.8 DUB (DYSFUNCTIONAL UTERINE BLEEDING): ICD-10-CM

## 2022-09-21 PROCEDURE — 99214 OFFICE O/P EST MOD 30 MIN: CPT | Performed by: FAMILY MEDICINE

## 2022-09-21 PROCEDURE — 90472 IMMUNIZATION ADMIN EACH ADD: CPT | Performed by: FAMILY MEDICINE

## 2022-09-21 PROCEDURE — G0145 SCR C/V CYTO,THINLAYER,RESCR: HCPCS

## 2022-09-21 PROCEDURE — 90715 TDAP VACCINE 7 YRS/> IM: CPT | Performed by: FAMILY MEDICINE

## 2022-09-21 PROCEDURE — 90674 CCIIV4 VAC NO PRSV 0.5 ML IM: CPT | Performed by: FAMILY MEDICINE

## 2022-09-21 PROCEDURE — 90471 IMMUNIZATION ADMIN: CPT | Performed by: FAMILY MEDICINE

## 2022-09-21 RX ORDER — EZETIMIBE 10 MG/1
10 TABLET ORAL DAILY
Qty: 30 TABLET | Refills: 1 | Status: SHIPPED | OUTPATIENT
Start: 2022-09-21 | End: 2022-10-18 | Stop reason: SDUPTHER

## 2022-09-21 NOTE — TELEPHONE ENCOUNTER
Spoke with [facility] lab, the EMILY was done but it is a send out lab and we are just waiting on results still. Nikhil Jonas they are working on it.

## 2022-09-21 NOTE — PROGRESS NOTES
Name: Fidel Conner  DOS: 2022  MRN: [id number]      Subjective:  Fidel Conner is a 49 y.o. female being seen for   Chief Complaint   Patient presents with    Gynecologic Exam     Also had labs would like to discuss       Vitals:    22 0804   BP: 124/86   Pulse: 86   SpO2: 96%     Allergies   Allergen Reactions    Amoxicillin      Past Medical History:   Diagnosis Date    Hashimoto's thyroiditis     Irregular menses     history of     Pilar cyst     Skin lesion of scalp     Skin tag     Thyroid cancer ([address]      Past Surgical History:   Procedure Laterality Date     SECTION      x2    CYST REMOVAL      from head     THYROIDECTOMY, PARTIAL      x2    TUBAL LIGATION      WISDOM TOOTH EXTRACTION  -     Social History     Socioeconomic History    Marital status:    Tobacco Use    Smoking status: Never    Smokeless tobacco: Never   Substance and Sexual Activity    Alcohol use: Yes     Comment: 2 drinks per week    Drug use: No     Social Determinants of Health     Financial Resource Strain: Low Risk     Difficulty of Paying Living Expenses: Not hard at all   Food Insecurity: No Food Insecurity    Worried About Running Out of Food in the Last Year: Never true    Ran Out of Food in the Last Year: Never true       Current Outpatient Medications   Medication Sig Dispense Refill    ezetimibe (ZETIA) 10 MG tablet Take 1 tablet by mouth daily 30 tablet 1    naltrexone (DEPADE) 50 MG tablet Take 50 mg by mouth daily      finasteride (PROSCAR) 5 MG tablet Take 5 mg by mouth daily      ketoconazole (NIZORAL) 2 % cream Apply topically three times a day 30 g 1    metFORMIN (GLUCOPHAGE-XR) 500 MG extended release tablet Take 1 tablet by mouth daily (with breakfast) 90 tablet 1    levothyroxine (SYNTHROID) 150 MCG tablet Take 1 tablet by mouth Daily 30 tablet 0    liothyronine (CYTOMEL) 5 MCG tablet Indications: every other day TAKE 1 TABLET DAILY 90 tablet 0    clobetasol (TEMOVATE) 0.05 % external solution Apply topically 2 times daily. 300 mL 3    clobetasol (TEMOVATE) 0.05 % external solution Apply topically 2 times daily. 300 mL 3    albuterol sulfate HFA (PROVENTIL HFA) 108 (90 Base) MCG/ACT inhaler Inhale 2 puffs into the lungs every 6 hours as needed for Wheezing Any albuterol inhaler ok to dispense 1 each 2    ketoconazole (NIZORAL) 2 % shampoo SHAMPOO THREE TIMES WEEKLY TO SCALP AND RINSE OFF. Iron, Ferrous Sulfate, 325 (65 Fe) MG TABS Take by mouth      metoprolol tartrate (LOPRESSOR) 50 MG tablet Take 1 tablet by mouth 2 times daily 180 tablet 3    b complex vitamins capsule Take 1 capsule by mouth daily      VITAMIN A PO Take by mouth      Cholecalciferol (VITAMIN D3) 50 MCG (2000 UT) CAPS Take by mouth      Ascorbic Acid (VITAMIN C) 250 MG tablet Take 250 mg by mouth daily (Patient not taking: No sig reported)      MAGNESIUM-ZINC PO Take by mouth Also has calcium       No current facility-administered medications for this visit. Objective:  Pt is here for a pap smear and to reviewe labs. She says during the ultrasound of the pelvis she had an irritated area right side labia majora. We discussed this during the pap. Review of Systems  Physical Exam  Exam conducted with a chaperone present. Chest:      Chest wall: No mass, lacerations, deformity, swelling, tenderness, crepitus or edema. Breasts:     Breasts are symmetrical.      Right: Normal. No swelling, bleeding, inverted nipple, mass, nipple discharge, skin change or tenderness. Left: Normal. No swelling, bleeding, inverted nipple, mass, nipple discharge, skin change or tenderness. Abdominal:      Hernia: There is no hernia in the left inguinal area or right inguinal area. Genitourinary:     General: Normal vulva. Exam position: Supine. Pubic Area: No rash or pubic lice. Labia:         Right: No rash, tenderness, lesion or injury. Left: No rash, tenderness, lesion or injury. Urethra: No prolapse, urethral pain, urethral swelling or urethral lesion. Comments: There was a little erythematous area right labia majora. It looks like just the first layer of skin was removed. Not a lesion, the pt was aware of she says it happened when she had the pelvic ultrasound done. Lymphadenopathy:      Lower Body: No right inguinal adenopathy. No left inguinal adenopathy. Assessment:   Diagnosis Orders   1. Pap smear for cervical cancer screening  PAP SMEAR      2. Need for influenza vaccination  Influenza, FLUCELVAX, (age 10 mo+), IM, Preservative Free, 0.5 mL      3. Need for tetanus booster  Tdap, BOOSTRIX, (age 8 yrs+), IM      4. DUB (dysfunctional uterine bleeding)  Gus Shirley MD, OB/GYN, [place]      5. Hypertriglyceridemia  ezetimibe (ZETIA) 10 MG tablet    Lipid Panel    Lipid Panel            Plan:  Orders Placed This Encounter   Procedures    Influenza, FLUCELVAX, (age 10 mo+), IM, Preservative Free, 0.5 mL    Tdap, BOOSTRIX, (age 8 yrs+), IM    PAP SMEAR     Patient History:    No LMP recorded. OBGYN Status: Having periods  Past Surgical History:  No date:  SECTION      Comment:  x2  : CYST REMOVAL      Comment:  from head   No date: THYROIDECTOMY, PARTIAL      Comment:  x2  No date: TUBAL LIGATION  -: WISDOM TOOTH EXTRACTION      Social History    Tobacco Use      Smoking status: Never      Smokeless tobacco: Never       Standing Status:   Future     Standing Expiration Date:   2023     Order Specific Question:   Collection Type     Answer: Thin Prep     Order Specific Question:   Prior Abnormal Pap Test     Answer:   No     Order Specific Question:   Screening or Diagnostic     Answer:   Screening     Order Specific Question:   HPV Requested?      Answer:   Yes - If Abnormal Reflex HPV     Order Specific Question:   High Risk Patient     Answer:   N/A    Lipid Panel     Standing Status:   Future     Standing Expiration Date:   2023 Order Specific Question:   Is Patient Fasting?/# of Hours     Answer:   Yes    Lipid Panel     Standing Status:   Future     Standing Expiration Date:   11/16/2022     Order Specific Question:   Is Patient Fasting?/# of Hours     Answer:   Yes    Sharath Bauman MD, OB/GYN, [place]     Referral Priority:   Routine     Referral Type:   Eval and Treat     Referral Reason:   Specialty Services Required     Referred to Provider:   Sonja Wright MD     Requested Specialty:   Obstetrics & Gynecology     Number of Visits Requested:   1         Patient Instructions   Nutrition Health Education:    Keep hydrated, walk 30 minutes minimum 3 times weekly as tolerated. Diet should consist of low fat, low sodium and high fiber. Nutritious foods such as fruits (if you're not diabetic), vegetables, lean meats, lean dairy, whole grains such as brown rice, quinoa, and dry beans. [place] with small amounts of heart healthy extra virgin olive oil. Be watchful of any extra salt/sugar/seasoning to your food. You should eat no more than 2 grams or 2,000 mg of salt daily. Salt will raise your BP. Avoid regular/diet sodas, caffeine, energy drinks as these are full of artificial ingredients/sweeteners, sugar, salt and chemicals that spike insulin and are harmful to your health. Sugar intake increases metabolic disfunction, type 2 diabetes, insulin resistance, addictive food behavior and obesity. Avoid all processed foods, foods from boxes, cans, microwave meals as these contain high salt, sugar or fat content and not much nutrition. Get at least 8 hrs of sleep every night and turn off all electronics at least 1 hour before bedtime as these decreases melatonin production and increases wakefulness. If your cholesterol is high, no greasy, fried, fast or fatty foods. Decrease red meat intake. No butter, brito, lard or creams, no milk as these things clog your arteries and leads to heart attacks and death.  If you smoke, smoking increases

## 2022-09-21 NOTE — PATIENT INSTRUCTIONS
Nutrition Health Education:    Keep hydrated, walk 30 minutes minimum 3 times weekly as tolerated. Diet should consist of low fat, low sodium and high fiber. Nutritious foods such as fruits (if you're not diabetic), vegetables, lean meats, lean dairy, whole grains such as brown rice, quinoa, and dry beans. [place] with small amounts of heart healthy extra virgin olive oil. Be watchful of any extra salt/sugar/seasoning to your food. You should eat no more than 2 grams or 2,000 mg of salt daily. Salt will raise your BP. Avoid regular/diet sodas, caffeine, energy drinks as these are full of artificial ingredients/sweeteners, sugar, salt and chemicals that spike insulin and are harmful to your health. Sugar intake increases metabolic disfunction, type 2 diabetes, insulin resistance, addictive food behavior and obesity. Avoid all processed foods, foods from boxes, cans, microwave meals as these contain high salt, sugar or fat content and not much nutrition. Get at least 8 hrs of sleep every night and turn off all electronics at least 1 hour before bedtime as these decreases melatonin production and increases wakefulness. If your cholesterol is high, no greasy, fried, fast or fatty foods. Decrease red meat intake. No butter, brito, lard or creams, no milk as these things clog your arteries and leads to heart attacks and death. If you smoke, smoking increases risk of lung disease, cancers, high BP, heart attack, stroke and death. Take your daily medications as prescribed and inform your family doctor if you are having any side effects or issues taking medications.      Elevated Cholesterol:  No greasy, fried, fast, fatty foods  No trans fats  Decreased red meat intake to once every 2 months  No butter, brito nor cream cheese, cheese  No egg yolk  NO milk  Decrease your cholesterol in diet    reviewed labs with pt cbc,cmp, lipids, tsh, t4, vit d, crp, sed rate  There was suppoesed to be an EMILY but it looks like this was not done  Reviewed pelvic ultrasound with pt    Refer to Dr. Gael Aguirre has a flu vaccine and a Tdap today and had no side effect during out visit    Start Zetia 10 mg daily    Please complete your entire course of medication treatment. Often patients will discontinue their medication for example, after just a few days when they begin to feel better this often leads to a return of your illness at a later time which is then more difficult to treat. Please finish your medications as prescribed. If there is a reason you cannot finish, suspect allergy, upset stomach, cost please call the office for assistance as we may be able to offer an alternative treatment.       Fasting blood work in 6 weeks I will call you with the results    Ordered BW for 6 months as well  Ordered a mammogram B/L     Messaged My MA to check to see what happened with the EMILY

## 2022-09-21 NOTE — TELEPHONE ENCOUNTER
----- Message from Jc Pitts DO sent at 9/21/2022  8:40 AM EDT -----  Please call up the lab where pt had labs done and ask if the EMILY was done and if they had to send it out.  If the EMILY was not done let me know so I can re order it

## 2022-09-22 DIAGNOSIS — M25.50 ARTHRALGIA, UNSPECIFIED JOINT: ICD-10-CM

## 2022-09-22 DIAGNOSIS — Z00.00 WELLNESS EXAMINATION: ICD-10-CM

## 2022-09-22 DIAGNOSIS — Z11.4 ENCOUNTER FOR SCREENING FOR HIV: ICD-10-CM

## 2022-09-22 DIAGNOSIS — Z11.59 ENCOUNTER FOR HEPATITIS C SCREENING TEST FOR LOW RISK PATIENT: ICD-10-CM

## 2022-09-23 ENCOUNTER — TELEPHONE (OUTPATIENT)
Dept: FAMILY MEDICINE CLINIC | Age: 47
End: 2022-09-23

## 2022-09-23 NOTE — TELEPHONE ENCOUNTER
Called pt.  Per provider request, patient stated she has her blood work orders and wants to wait to schedule her follow up after her Mammogram. She'll call us.        ----- Message from Lily Bello DO sent at 9/23/2022 10:09 AM EDT -----  Please call pt she is to have fasting blood work in 6 weeks and have a follow up appt in 7 weeks I do not see that she made and appt

## 2022-10-04 ENCOUNTER — HOSPITAL ENCOUNTER (OUTPATIENT)
Dept: MAMMOGRAPHY | Age: 47
Discharge: HOME | End: 2022-10-06
Payer: COMMERCIAL

## 2022-10-04 DIAGNOSIS — Z12.31 ENCOUNTER FOR SCREENING MAMMOGRAM FOR MALIGNANT NEOPLASM OF BREAST: ICD-10-CM

## 2022-10-04 DIAGNOSIS — Z01.419 ENCOUNTER FOR GYNECOLOGICAL EXAMINATION WITHOUT ABNORMAL FINDING: Primary | ICD-10-CM

## 2022-10-04 LAB — CYTOLOGY REPORT: NORMAL

## 2022-10-04 PROCEDURE — 77063 BREAST TOMOSYNTHESIS BI: CPT

## 2022-10-04 NOTE — RESULT ENCOUNTER NOTE
Please call pt as I did not get the transformation zone, needs a repeat pap in 3 months with GYN, no malignancy

## 2022-10-13 DIAGNOSIS — E89.0 POSTOPERATIVE HYPOTHYROIDISM: ICD-10-CM

## 2022-10-13 NOTE — TELEPHONE ENCOUNTER
Enedina Zelaya is requesting a refill on the following medication(s):  Requested Prescriptions     Pending Prescriptions Disp Refills    levothyroxine (SYNTHROID) 150 MCG tablet 30 tablet 0     Sig: Take 1 tablet by mouth Daily       Last Visit Date (If Applicable):  2/20/1590    Next Visit Date:    3/21/2023

## 2022-10-14 RX ORDER — LEVOTHYROXINE SODIUM 0.15 MG/1
150 TABLET ORAL DAILY
Qty: 30 TABLET | Refills: 2 | Status: SHIPPED | OUTPATIENT
Start: 2022-10-14

## 2022-10-17 DIAGNOSIS — E89.0 POSTOPERATIVE HYPOTHYROIDISM: ICD-10-CM

## 2022-10-17 RX ORDER — LEVOTHYROXINE SODIUM 0.15 MG/1
150 TABLET ORAL DAILY
Qty: 90 TABLET | Refills: 0 | OUTPATIENT
Start: 2022-10-17

## 2022-10-17 NOTE — TELEPHONE ENCOUNTER
Marj Sethi is calling to request a refill on the following medication(s):  Requested Prescriptions     Pending Prescriptions Disp Refills    levothyroxine (SYNTHROID) 150 MCG tablet 90 tablet 0     Sig: Take 1 tablet by mouth Daily       Last Visit Date (If Applicable):  Visit date not found    Next Visit Date:    Visit date not found

## 2022-10-18 DIAGNOSIS — E78.1 HYPERTRIGLYCERIDEMIA: ICD-10-CM

## 2022-10-18 RX ORDER — EZETIMIBE 10 MG/1
10 TABLET ORAL DAILY
Qty: 30 TABLET | Refills: 5 | Status: SHIPPED | OUTPATIENT
Start: 2022-10-18 | End: 2022-11-17

## 2022-10-18 NOTE — TELEPHONE ENCOUNTER
Urvashi Valdez is requesting a refill on the following medication(s):  Requested Prescriptions     Pending Prescriptions Disp Refills    ezetimibe (ZETIA) 10 MG tablet 30 tablet 5     Sig: Take 1 tablet by mouth daily       Last Visit Date (If Applicable):  1/78/4364    Next Visit Date:    3/21/2023

## 2022-11-09 LAB
ANION GAP SERPL CALCULATED.3IONS-SCNC: 12.3 MMOL/L
BACTERIA, URINE: ABNORMAL
BASOPHILS %: 1.38 (ref 0–3)
BASOPHILS ABSOLUTE: 0.1 (ref 0–0.3)
BILIRUBIN URINE: NEGATIVE
BLOOD, URINE: ABNORMAL
BUN BLDV-MCNC: 13 MG/DL (ref 7–17)
CALCIUM SERPL-MCNC: 9.4 MG/DL (ref 8.4–10.2)
CASTS UA: ABNORMAL
CHLORIDE BLD-SCNC: 99 MMOL/L (ref 98–120)
CLARITY: ABNORMAL
CO2: 25 MMOL/L (ref 22–31)
COLOR, URINE: YELLOW
CREAT SERPL-MCNC: 0.6 MG/DL (ref 0.5–1)
CRYSTALS, UA: ABNORMAL
EOSINOPHILS %: 3.36 (ref 0–10)
EOSINOPHILS ABSOLUTE: 0.24 (ref 0–1.1)
GFR CALCULATED: > 60
GLUCOSE URINE: NEGATIVE MG/DL
GLUCOSE: 290 MG/DL (ref 65–105)
HCT VFR BLD CALC: 43.9 % (ref 37–47)
HEMOGLOBIN: 14.3 (ref 12–16)
KETONES, URINE: NEGATIVE MG/DL
LEUKOCYTE ESTERASE, URINE: ABNORMAL
LYMPHOCYTE %: 30.44 (ref 20–51.1)
LYMPHOCYTES ABSOLUTE: 2.15 (ref 1–5.5)
MCH RBC QN AUTO: 29.1 PG (ref 28.5–32.5)
MCHC RBC AUTO-ENTMCNC: 32.5 G/DL (ref 32–37)
MCV RBC AUTO: 89.6 FL (ref 80–94)
MONOCYTES %: 7.65 (ref 1.7–9.3)
MONOCYTES ABSOLUTE: 0.54 (ref 0.1–1)
MUCUS, URINE: ABNORMAL
NEUTROPHILS %: 57.17 (ref 42.2–75.2)
NEUTROPHILS ABSOLUTE: 4.05 (ref 2–8.1)
NITRITE, URINE: POSITIVE
PDW BLD-RTO: 13.5 % (ref 8.5–15.5)
PH UA: 6 (ref 5–8.5)
PLATELET # BLD: 293.3 THOU/MM3 (ref 130–400)
POTASSIUM SERPL-SCNC: 4.3 MMOL/L (ref 3.6–5)
PROTEIN UA: ABNORMAL MG/DL
RBC URINE: ABNORMAL (ref 0–2)
RBC: 4.9 M/UL (ref 4.2–5.4)
SODIUM BLD-SCNC: 137 MMOL/L (ref 135–145)
SPECIFIC GRAVITY, URINE: 1.02 MG/DL (ref 1–1.03)
SQUAMOUS EPITHELIAL: ABNORMAL
TRICHOMONAS, URINE: ABNORMAL
UROBILINOGEN, URINE: 0.2 MG/DL (ref 0.2–1)
WBC URINE: ABNORMAL (ref 0–4)
WBC: 7.1 THOU/ML3 (ref 4.8–10.8)
YEAST, URINE: ABNORMAL

## 2022-11-15 LAB — HCG QUALITATIVE: NEGATIVE

## 2022-11-17 LAB
ANION GAP SERPL CALCULATED.3IONS-SCNC: 6.8 MMOL/L
BASOPHILS %: 0.49 (ref 0–3)
BASOPHILS ABSOLUTE: 0.04 (ref 0–0.3)
BUN BLDV-MCNC: 10 MG/DL (ref 7–17)
CALCIUM SERPL-MCNC: 8.6 MG/DL (ref 8.4–10.2)
CHLORIDE BLD-SCNC: 104 MMOL/L (ref 98–120)
CO2: 25 MMOL/L (ref 22–31)
CREAT SERPL-MCNC: 0.7 MG/DL (ref 0.5–1)
CREATININE CLEARANCE: 92.01
EOSINOPHILS %: 0.18 (ref 0–10)
EOSINOPHILS ABSOLUTE: 0.02 (ref 0–1.1)
GFR CALCULATED: > 60
GLUCOSE: 186 MG/DL (ref 65–105)
HCT VFR BLD CALC: 32.1 % (ref 37–47)
HEMOGLOBIN: 10.4 (ref 12–16)
LYMPHOCYTE %: 28.29 (ref 20–51.1)
LYMPHOCYTES ABSOLUTE: 2.35 (ref 1–5.5)
MCH RBC QN AUTO: 29.1 PG (ref 28.5–32.5)
MCHC RBC AUTO-ENTMCNC: 32.3 G/DL (ref 32–37)
MCV RBC AUTO: 90.2 FL (ref 80–94)
MONOCYTES %: 7.41 (ref 1.7–9.3)
MONOCYTES ABSOLUTE: 0.62 (ref 0.1–1)
NEUTROPHILS %: 63.63 (ref 42.2–75.2)
NEUTROPHILS ABSOLUTE: 5.29 (ref 2–8.1)
PDW BLD-RTO: 13.6 % (ref 8.5–15.5)
PLATELET # BLD: 220.9 THOU/MM3 (ref 130–400)
POTASSIUM SERPL-SCNC: 4 MMOL/L (ref 3.6–5)
RBC: 3.56 M/UL (ref 4.2–5.4)
SODIUM BLD-SCNC: 136 MMOL/L (ref 135–145)
WBC: 8.3 THOU/ML3 (ref 4.8–10.8)

## 2022-11-25 ENCOUNTER — OFFICE VISIT (OUTPATIENT)
Dept: FAMILY MEDICINE CLINIC | Age: 47
End: 2022-11-25
Payer: COMMERCIAL

## 2022-11-25 VITALS
WEIGHT: 202 LBS | BODY MASS INDEX: 32.47 KG/M2 | HEIGHT: 66 IN | HEART RATE: 92 BPM | DIASTOLIC BLOOD PRESSURE: 72 MMHG | OXYGEN SATURATION: 99 % | SYSTOLIC BLOOD PRESSURE: 108 MMHG | RESPIRATION RATE: 16 BRPM | TEMPERATURE: 98.8 F

## 2022-11-25 DIAGNOSIS — N30.00 ACUTE CYSTITIS WITHOUT HEMATURIA: ICD-10-CM

## 2022-11-25 DIAGNOSIS — R30.9 PAIN WITH URINATION: Primary | ICD-10-CM

## 2022-11-25 DIAGNOSIS — E11.9 TYPE 2 DIABETES MELLITUS WITHOUT COMPLICATION, WITHOUT LONG-TERM CURRENT USE OF INSULIN (HCC): ICD-10-CM

## 2022-11-25 LAB
BILIRUBIN, POC: NORMAL
BLOOD URINE, POC: NORMAL
CLARITY, POC: CLEAR
COLOR, POC: YELLOW
GLUCOSE URINE, POC: NORMAL
KETONES, POC: NORMAL
LEUKOCYTE EST, POC: NORMAL
NITRITE, POC: NORMAL
PH, POC: 5.5
PROTEIN, POC: NORMAL
SPECIFIC GRAVITY, POC: >=1.03
UROBILINOGEN, POC: 0.2

## 2022-11-25 PROCEDURE — 81002 URINALYSIS NONAUTO W/O SCOPE: CPT

## 2022-11-25 PROCEDURE — 3044F HG A1C LEVEL LT 7.0%: CPT

## 2022-11-25 PROCEDURE — 99213 OFFICE O/P EST LOW 20 MIN: CPT

## 2022-11-25 RX ORDER — CIPROFLOXACIN 500 MG/1
500 TABLET, FILM COATED ORAL 2 TIMES DAILY
Qty: 14 TABLET | Refills: 0 | Status: SHIPPED | OUTPATIENT
Start: 2022-11-25 | End: 2022-12-02

## 2022-11-25 RX ORDER — BLOOD-GLUCOSE METER
1 KIT MISCELLANEOUS DAILY
Qty: 1 KIT | Refills: 0 | Status: SHIPPED | OUTPATIENT
Start: 2022-11-25

## 2022-11-25 RX ORDER — OXYCODONE HYDROCHLORIDE AND ACETAMINOPHEN 5; 325 MG/1; MG/1
TABLET ORAL
COMMUNITY
Start: 2022-11-17

## 2022-11-25 NOTE — PROGRESS NOTES
Otilia Dejesus (:  1975) is a 49 y.o. female,Established patient, here for evaluation of the following chief complaint(s):  Urinary Tract Infection         ASSESSMENT/PLAN:  1. Pain with urination  -     POCT Urinalysis no Micro  -     ciprofloxacin (CIPRO) 500 MG tablet; Take 1 tablet by mouth 2 times daily for 7 days, Disp-14 tablet, R-0Normal  2. Acute cystitis without hematuria  -     ciprofloxacin (CIPRO) 500 MG tablet; Take 1 tablet by mouth 2 times daily for 7 days, Disp-14 tablet, R-0Normal  3. Type 2 diabetes mellitus without complication, without long-term current use of insulin ([facility])  Assessment & Plan:   At goal, continue current medications and continue current treatment plan continue metformin 1000 mg twice daily with meals. Please check blood sugar at his last A1c was normal however blood glucose prior to vaginal hysterectomy was 190 and this was fasting. Report blood sugars to the office as this could be causation for frequent urinary tract infections. As well as increased risk for postop infections. Orders:  -     glucose monitoring (FREESTYLE FREEDOM) kit; DAILY Starting 2022, Disp-1 kit, R-0, Normal      Return in about 2 weeks (around 2022), or if symptoms worsen or fail to improve. Subjective   SUBJECTIVE/OBJECTIVE:  Gerry Lomeli is here today for the complaint of dysuria. She recently had a vaginal hysterectomy less than 30 days ago. Had a urinary tract post surgical procedure and was put on an antibiotic. She follows up with OB/GYN for this. Subsequently her infection had went away, however she started having symptoms again. Onset was in the last several days. Symptoms include dysuria, increased frequency, denies chills or fever. Unsure of last medication for urinary tract infection, Cipro is discussed.   Including risk for Achilles tendon rupture, however I feel this would be an appropriate antibiotic considering her recent surgical procedure as well as antibiotic use. She verbalized understanding of this, blood sugar was noted to be elevated despite being fasting. Last A1c months prior was within goal, however does not match fasting blood sugar. She is to continue to monitor her blood sugar, increase metformin to twice daily, let office know what blood sugars are. Review of Systems   Constitutional:  Positive for fatigue. Negative for fever. HENT:  Negative for congestion. Respiratory:  Negative for cough, chest tightness and shortness of breath. Cardiovascular:  Negative for chest pain and palpitations. Gastrointestinal:  Negative for abdominal pain. Genitourinary:  Positive for dysuria, frequency and urgency. Musculoskeletal:  Negative for arthralgias, back pain and gait problem. Skin:  Negative for color change. Objective   Physical Exam  Vitals and nursing note reviewed. Constitutional:       General: She is not in acute distress. Appearance: Normal appearance. She is not ill-appearing. HENT:      Head: Normocephalic and atraumatic. Right Ear: Tympanic membrane and external ear normal.      Left Ear: Tympanic membrane and external ear normal.      Nose: Nose normal. No congestion or rhinorrhea. Mouth/Throat:      Mouth: Mucous membranes are moist.      Pharynx: Oropharynx is clear. No posterior oropharyngeal erythema. Eyes:      Pupils: Pupils are equal, round, and reactive to light. Cardiovascular:      Rate and Rhythm: Normal rate and regular rhythm. Pulses: Normal pulses. Heart sounds: Normal heart sounds. Pulmonary:      Effort: Pulmonary effort is normal.      Breath sounds: Normal breath sounds. No wheezing or rhonchi. Abdominal:      General: Bowel sounds are normal.      Palpations: There is no mass. Tenderness: There is no abdominal tenderness. Musculoskeletal:         General: No swelling or tenderness. Normal range of motion. Cervical back: Normal range of motion.  No rigidity or tenderness. Skin:     General: Skin is warm and dry. Capillary Refill: Capillary refill takes less than 2 seconds. Coloration: Skin is not pale. Findings: No erythema. Neurological:      General: No focal deficit present. Mental Status: She is alert and oriented to person, place, and time. Psychiatric:         Mood and Affect: Mood normal.         Behavior: Behavior normal.         Thought Content: Thought content normal.         Judgment: Judgment normal.                An electronic signature was used to authenticate this note.     --JAXON Cisneros - CNP

## 2022-11-29 ASSESSMENT — ENCOUNTER SYMPTOMS
ABDOMINAL PAIN: 0
COUGH: 0
BACK PAIN: 0
CHEST TIGHTNESS: 0
SHORTNESS OF BREATH: 0
COLOR CHANGE: 0

## 2022-11-29 NOTE — ASSESSMENT & PLAN NOTE
At goal, continue current medications and continue current treatment plan continue metformin 1000 mg twice daily with meals. Please check blood sugar at his last A1c was normal however blood glucose prior to vaginal hysterectomy was 190 and this was fasting. Report blood sugars to the office as this could be causation for frequent urinary tract infections. As well as increased risk for postop infections.

## 2022-12-02 RX ORDER — BLOOD-GLUCOSE METER
1 KIT MISCELLANEOUS DAILY
Qty: 100 EACH | Refills: 3 | Status: CANCELLED | OUTPATIENT
Start: 2022-12-02

## 2022-12-02 RX ORDER — BLOOD-GLUCOSE METER
1 KIT MISCELLANEOUS DAILY
COMMUNITY

## 2022-12-02 NOTE — TELEPHONE ENCOUNTER
Verbal order for test strips and lancets given to Jasper Sethi is requesting a refill on the following medication(s):  Requested Prescriptions     Pending Prescriptions Disp Refills    blood glucose test strips (FREESTYLE LITE) strip 100 each 3     Si each by In Vitro route daily As needed. Last Visit Date (If Applicable):      Next Visit Date:    2022     ----- Message from Martín Ochoa. Bebeto sent at 2022  2:09 PM EST -----  Regarding: test strips for freestyle kit  Hello - when I saw you last Friday you sent in a prescritption for a \"freestyle lite kit\" but according to the pharmacy technician at [place] no test strips were ordered. [facility] sent the prescription back to [id number] [facility] for correction/addition but I don't believe that has happened yet. According to [place] I should not  the kit until test strips are ordered too to make sure everything is ok with my insurance. Please advise if this is not the case or submit the script for test strips asa. Thank you!   Sameera Askew

## 2022-12-19 ENCOUNTER — OFFICE VISIT (OUTPATIENT)
Dept: FAMILY MEDICINE CLINIC | Age: 47
End: 2022-12-19
Payer: COMMERCIAL

## 2022-12-19 VITALS
WEIGHT: 191.6 LBS | DIASTOLIC BLOOD PRESSURE: 76 MMHG | BODY MASS INDEX: 30.79 KG/M2 | HEIGHT: 66 IN | HEART RATE: 98 BPM | OXYGEN SATURATION: 100 % | SYSTOLIC BLOOD PRESSURE: 115 MMHG

## 2022-12-19 DIAGNOSIS — E11.9 TYPE 2 DIABETES MELLITUS WITHOUT COMPLICATION, WITHOUT LONG-TERM CURRENT USE OF INSULIN (HCC): Primary | ICD-10-CM

## 2022-12-19 DIAGNOSIS — E89.0 POSTOPERATIVE HYPOTHYROIDISM: ICD-10-CM

## 2022-12-19 DIAGNOSIS — I10 ESSENTIAL HYPERTENSION: ICD-10-CM

## 2022-12-19 LAB — HBA1C MFR BLD: 7.1 %

## 2022-12-19 PROCEDURE — 3051F HG A1C>EQUAL 7.0%<8.0%: CPT

## 2022-12-19 PROCEDURE — 3074F SYST BP LT 130 MM HG: CPT

## 2022-12-19 PROCEDURE — 3078F DIAST BP <80 MM HG: CPT

## 2022-12-19 PROCEDURE — 83036 HEMOGLOBIN GLYCOSYLATED A1C: CPT

## 2022-12-19 PROCEDURE — 99214 OFFICE O/P EST MOD 30 MIN: CPT

## 2022-12-19 RX ORDER — LEVOTHYROXINE SODIUM 0.15 MG/1
150 TABLET ORAL DAILY
Qty: 90 TABLET | Refills: 2 | Status: SHIPPED | OUTPATIENT
Start: 2022-12-19

## 2022-12-19 RX ORDER — METOPROLOL SUCCINATE 50 MG/1
50 TABLET, EXTENDED RELEASE ORAL DAILY
Qty: 90 TABLET | Refills: 1 | Status: SHIPPED | OUTPATIENT
Start: 2022-12-19

## 2022-12-19 RX ORDER — LANCETS 28 GAUGE
EACH MISCELLANEOUS
COMMUNITY
Start: 2022-12-02

## 2022-12-19 NOTE — PROGRESS NOTES
Patricia Delgadillo (:  1975) is a 49 y.o. female,Established patient, here for evaluation of the following chief complaint(s):  Urinary Tract Infection (Pt is here for a 2 week follow up. She was here at the walk in on 22 for a UTI. She is doing better now and hasn't had any symptoms for UTI. She went to her gynocologist on 22 and they tested her for UTI, result are in her chart. Pt has been recording her Blood sugars too. She states it has been high. )         ASSESSMENT/PLAN:  1. Type 2 diabetes mellitus without complication, without long-term current use of insulin ([facility])  Assessment & Plan:   Uncontrolled, continue current medications and continue current treatment plan A1c is 7.1, however she has not been on metformin for 3 months. We will continue metformin at 1000 mg 2 times daily with meals. This was a sudden onset change of her blood sugar spanning back in November. I question whether this is truly type 2 diabetes versus type I. In 3 months I do recommend a repeat A1c as well as a C-peptide to differentiate possibility of this being type I. She is to continue to monitor blood sugar at home report elevations to office. Report elevations greater than 250 blood sugars, report blood sugars lower than 70. Conor Lowe verbalized understanding this plan of care  Orders:  -     POCT Hb A1C (glycosylated hemoglobin)  -     metFORMIN (GLUCOPHAGE) 1000 MG tablet; Take 1 tablet by mouth 2 times daily (with meals), Disp-90 tablet, R-1Normal  2. Essential hypertension  -     metoprolol succinate (TOPROL XL) 50 MG extended release tablet; Take 1 tablet by mouth daily, Disp-90 tablet, R-1Normal  3. Postoperative hypothyroidism  Assessment & Plan:   At goal, continue current medications and continue current treatment plan continue current dose of levothyroxine 150 mcg daily, continue Cytomel 5 mcg daily. Denies any side effects these medications. We will continue to monitor TSH and free T4.   Orders:  - levothyroxine (SYNTHROID) 150 MCG tablet; Take 1 tablet by mouth Daily, Disp-90 tablet, R-2Normal      Return in about 6 months (around 6/19/2023), or if symptoms worsen or fail to improve. Subjective   SUBJECTIVE/OBJECTIVE:  Jeb Boston is in the office today for follow-up on diabetes mellitus. It was noted preoperative before her OB/GYN surgical intervention that her blood sugar was elevating. A1c at that time was not above 6.4, however her blood sugar was approximately 200. She was later seen in the office for a urinary tract infection which she states has resolved, had follow-up urine testing with OB/GYN. At that time she was given a glucometer and started on metformin. Today in office her A1c is 7.1 slightly above goal range. She had not been on metformin for more than 3 months so, so we will wait for another 3 months to recheck an A1c. With the sudden onset of hyperglycemia I do question whether this is type I versus type 2 diabetes. This is discussed at length with Jeanna Veras as well as treatment options. We will recheck A1c in 3 months as well as C-peptide. She denies any further needs, she is to continue to monitor blood sugar at least once daily. Review of Systems   Constitutional:  Negative for chills, fatigue, fever and unexpected weight change. HENT:  Negative for congestion, ear pain, rhinorrhea, sinus pressure and sinus pain. Eyes:  Negative for discharge, itching and visual disturbance. Respiratory:  Negative for cough, chest tightness, shortness of breath and wheezing. Cardiovascular:  Negative for chest pain, palpitations and leg swelling. Gastrointestinal:  Negative for abdominal pain, constipation, diarrhea and nausea. Endocrine: Negative for cold intolerance, heat intolerance, polydipsia and polyphagia. Genitourinary:  Negative for dysuria, flank pain and frequency. Musculoskeletal:  Negative for arthralgias, back pain and myalgias.    Skin:  Negative for color change. Allergic/Immunologic: Negative for environmental allergies and food allergies. Neurological:  Negative for dizziness, weakness, light-headedness, numbness and headaches. Psychiatric/Behavioral:  Negative for agitation, behavioral problems and confusion. The patient is not nervous/anxious. Objective   Physical Exam  Vitals and nursing note reviewed. Constitutional:       General: She is not in acute distress. Appearance: Normal appearance. She is not ill-appearing. HENT:      Head: Normocephalic and atraumatic. Right Ear: Tympanic membrane and external ear normal.      Left Ear: Tympanic membrane and external ear normal.      Nose: Nose normal. No congestion or rhinorrhea. Mouth/Throat:      Mouth: Mucous membranes are moist.      Pharynx: Oropharynx is clear. No posterior oropharyngeal erythema. Eyes:      Pupils: Pupils are equal, round, and reactive to light. Cardiovascular:      Rate and Rhythm: Normal rate and regular rhythm. Pulses: Normal pulses. Heart sounds: Normal heart sounds. Pulmonary:      Effort: Pulmonary effort is normal.      Breath sounds: Normal breath sounds. No wheezing or rhonchi. Abdominal:      General: Bowel sounds are normal.      Palpations: There is no mass. Tenderness: There is no abdominal tenderness. Musculoskeletal:         General: No swelling or tenderness. Normal range of motion. Cervical back: Normal range of motion. No rigidity or tenderness. Skin:     General: Skin is warm and dry. Capillary Refill: Capillary refill takes less than 2 seconds. Coloration: Skin is not pale. Findings: No erythema. Neurological:      General: No focal deficit present. Mental Status: She is alert and oriented to person, place, and time. Psychiatric:         Mood and Affect: Mood normal.         Behavior: Behavior normal.         Thought Content:  Thought content normal.         Judgment: Judgment normal. An electronic signature was used to authenticate this note.     --Cathleen Carroll, APRN - CNP

## 2022-12-20 PROBLEM — I10 ESSENTIAL HYPERTENSION: Status: ACTIVE | Noted: 2022-12-20

## 2022-12-20 ASSESSMENT — ENCOUNTER SYMPTOMS
WHEEZING: 0
ABDOMINAL PAIN: 0
BACK PAIN: 0
EYE DISCHARGE: 0
SINUS PRESSURE: 0
CONSTIPATION: 0
CHEST TIGHTNESS: 0
NAUSEA: 0
SINUS PAIN: 0
COLOR CHANGE: 0
SHORTNESS OF BREATH: 0
EYE ITCHING: 0
RHINORRHEA: 0
DIARRHEA: 0
COUGH: 0

## 2022-12-20 NOTE — ASSESSMENT & PLAN NOTE
At goal, continue current medications and continue current treatment plan continue current dose of levothyroxine 150 mcg daily, continue Cytomel 5 mcg daily. Denies any side effects these medications. We will continue to monitor TSH and free T4.

## 2022-12-20 NOTE — ASSESSMENT & PLAN NOTE
At goal, changes made today: Change medication to Toprol-XL 50 mg. She will take this only once a day as compared to Lopressor which she was taking twice a day. She is to continue to monitor heart rate and blood pressure and report these to the office. May need to increase this in the future, we will wait until she has some readings with this medication. Side effects this medication are discussed and denied at this time. Aditya Baker verbalized understanding.

## 2022-12-20 NOTE — ASSESSMENT & PLAN NOTE
Uncontrolled, continue current medications and continue current treatment plan A1c is 7.1, however she has not been on metformin for 3 months. We will continue metformin at 1000 mg 2 times daily with meals. This was a sudden onset change of her blood sugar spanning back in November. I question whether this is truly type 2 diabetes versus type I. In 3 months I do recommend a repeat A1c as well as a C-peptide to differentiate possibility of this being type I. She is to continue to monitor blood sugar at home report elevations to office. Report elevations greater than 250 blood sugars, report blood sugars lower than 70.   Gregory Guy verbalized understanding this plan of care I have reviewed and confirmed nurses' notes...

## 2022-12-21 DIAGNOSIS — Z80.9 FAMILY HISTORY OF CANCER: ICD-10-CM

## 2022-12-21 DIAGNOSIS — C73 THYROID CANCER (HCC): Primary | ICD-10-CM

## 2022-12-22 DIAGNOSIS — E11.9 TYPE 2 DIABETES MELLITUS WITHOUT COMPLICATION, WITHOUT LONG-TERM CURRENT USE OF INSULIN (HCC): ICD-10-CM

## 2022-12-22 NOTE — TELEPHONE ENCOUNTER
Needs 90 day rx.      Otilia Dejesus is requesting a refill on the following medication(s):  Requested Prescriptions     Pending Prescriptions Disp Refills    metFORMIN (GLUCOPHAGE) 1000 MG tablet 180 tablet 1     Sig: Take 1 tablet by mouth 2 times daily (with meals)       Last Visit Date (If Applicable):  73/95/4209    Next Visit Date:    Visit date not found

## 2022-12-27 RX ORDER — BLOOD-GLUCOSE METER
1 KIT MISCELLANEOUS DAILY
Qty: 100 EACH | Refills: 1 | Status: SHIPPED | OUTPATIENT
Start: 2022-12-27

## 2022-12-27 NOTE — TELEPHONE ENCOUNTER
William Encarnacion is requesting a refill on the following medication(s):  Requested Prescriptions     Pending Prescriptions Disp Refills    blood glucose test strips (FREESTYLE LITE) strip 100 each      Si each by In Vitro route daily As needed.        Last Visit Date (If Applicable):      Next Visit Date:    2023

## 2023-01-03 NOTE — TELEPHONE ENCOUNTER
Malu Delatorre is requesting a refill on the following medication(s):  Requested Prescriptions     Pending Prescriptions Disp Refills    blood glucose test strips (FREESTYLE LITE) strip 100 each 1     Si each by In Vitro route daily As needed.        Last Visit Date (If Applicable):      Next Visit Date:    2023

## 2023-01-09 RX ORDER — BLOOD-GLUCOSE METER
1 KIT MISCELLANEOUS DAILY
Qty: 100 EACH | Refills: 1 | Status: SHIPPED | OUTPATIENT
Start: 2023-01-09

## 2023-02-06 ENCOUNTER — PATIENT MESSAGE (OUTPATIENT)
Dept: FAMILY MEDICINE CLINIC | Age: 48
End: 2023-02-06

## 2023-02-06 ENCOUNTER — TELEPHONE (OUTPATIENT)
Dept: FAMILY MEDICINE CLINIC | Age: 48
End: 2023-02-06

## 2023-02-06 NOTE — TELEPHONE ENCOUNTER
----- Message from Chapis Ibanez. Bebeto sent at 2/6/2023 11:09 AM EST -----  Regarding: BG - still high? Hello - I just wanted to check in with you & send some recent BG numbers. I seem to be doing pretty well, but not great, throughout the day (I try to check it 2-3 times per day) but my morning/fasting is always high (140-150 range sometimes higher, on occasion lower but not often or recently). I generally do not eat after 7 pm so these high numbers are very confusing to me. Please see the attached report from the last 30 days & let me know if you would like to change anything or have any suggestions. I'm currently taking 2000 mcg  metformin daily - 1000 with breakfast & 1000 with dinner in the evening. Thank you!   Ann Choudhary

## 2023-02-06 NOTE — TELEPHONE ENCOUNTER
From: Silvana Jacinto  To: Jordin Tyson  Sent: 2/6/2023 11:09 AM EST  Subject: BG - still high? Hello - I just wanted to check in with you & send some recent BG numbers. I seem to be doing pretty well, but not great, throughout the day (I try to check it 2-3 times per day) but my morning/fasting is always high (140-150 range sometimes higher, on occasion lower but not often or recently). I generally do not eat after 7 pm so these high numbers are very confusing to me. Please see the attached report from the last 30 days & let me know if you would like to change anything or have any suggestions. I'm currently taking 2000 mcg metformin daily - 1000 with breakfast & 1000 with dinner in the evening. Thank you!   Milagros Bundy

## 2023-02-06 NOTE — TELEPHONE ENCOUNTER
Pt called back. She verbally understood with the information that was provided to her. She states she will check with her insurance about what medication they will pay for and she will get back to us.

## 2023-02-13 ENCOUNTER — OFFICE VISIT (OUTPATIENT)
Dept: FAMILY MEDICINE CLINIC | Age: 48
End: 2023-02-13
Payer: COMMERCIAL

## 2023-02-13 VITALS
HEART RATE: 85 BPM | DIASTOLIC BLOOD PRESSURE: 74 MMHG | BODY MASS INDEX: 29.05 KG/M2 | SYSTOLIC BLOOD PRESSURE: 132 MMHG | OXYGEN SATURATION: 99 % | WEIGHT: 180 LBS

## 2023-02-13 DIAGNOSIS — E03.8 HYPOTHYROIDISM DUE TO HASHIMOTO'S THYROIDITIS: ICD-10-CM

## 2023-02-13 DIAGNOSIS — L72.9 INFECTED CYST OF SKIN: ICD-10-CM

## 2023-02-13 DIAGNOSIS — R73.9 HYPERGLYCEMIA: ICD-10-CM

## 2023-02-13 DIAGNOSIS — C73 THYROID CANCER (HCC): ICD-10-CM

## 2023-02-13 DIAGNOSIS — E11.9 TYPE 2 DIABETES MELLITUS WITHOUT COMPLICATION, WITHOUT LONG-TERM CURRENT USE OF INSULIN (HCC): ICD-10-CM

## 2023-02-13 DIAGNOSIS — E06.3 HYPOTHYROIDISM DUE TO HASHIMOTO'S THYROIDITIS: ICD-10-CM

## 2023-02-13 DIAGNOSIS — L65.9 HAIR LOSS: ICD-10-CM

## 2023-02-13 DIAGNOSIS — L08.9 INFECTED CYST OF SKIN: ICD-10-CM

## 2023-02-13 DIAGNOSIS — I10 ESSENTIAL HYPERTENSION: ICD-10-CM

## 2023-02-13 DIAGNOSIS — Z13.220 LIPID SCREENING: ICD-10-CM

## 2023-02-13 DIAGNOSIS — Z00.00 ENCOUNTER FOR WELL ADULT EXAM WITHOUT ABNORMAL FINDINGS: Primary | ICD-10-CM

## 2023-02-13 PROCEDURE — 99396 PREV VISIT EST AGE 40-64: CPT

## 2023-02-13 PROCEDURE — 3078F DIAST BP <80 MM HG: CPT

## 2023-02-13 PROCEDURE — 3075F SYST BP GE 130 - 139MM HG: CPT

## 2023-02-13 RX ORDER — FINASTERIDE 5 MG/1
5 TABLET, FILM COATED ORAL DAILY
Qty: 90 TABLET | Refills: 1 | Status: SHIPPED | OUTPATIENT
Start: 2023-02-13

## 2023-02-13 RX ORDER — CEPHALEXIN 500 MG/1
500 CAPSULE ORAL 4 TIMES DAILY
Qty: 40 CAPSULE | Refills: 0 | Status: SHIPPED | OUTPATIENT
Start: 2023-02-13 | End: 2023-02-23

## 2023-02-13 SDOH — ECONOMIC STABILITY: INCOME INSECURITY: HOW HARD IS IT FOR YOU TO PAY FOR THE VERY BASICS LIKE FOOD, HOUSING, MEDICAL CARE, AND HEATING?: NOT HARD AT ALL

## 2023-02-13 SDOH — ECONOMIC STABILITY: FOOD INSECURITY: WITHIN THE PAST 12 MONTHS, YOU WORRIED THAT YOUR FOOD WOULD RUN OUT BEFORE YOU GOT MONEY TO BUY MORE.: NEVER TRUE

## 2023-02-13 SDOH — ECONOMIC STABILITY: HOUSING INSECURITY
IN THE LAST 12 MONTHS, WAS THERE A TIME WHEN YOU DID NOT HAVE A STEADY PLACE TO SLEEP OR SLEPT IN A SHELTER (INCLUDING NOW)?: NO

## 2023-02-13 SDOH — ECONOMIC STABILITY: FOOD INSECURITY: WITHIN THE PAST 12 MONTHS, THE FOOD YOU BOUGHT JUST DIDN'T LAST AND YOU DIDN'T HAVE MONEY TO GET MORE.: NEVER TRUE

## 2023-02-13 ASSESSMENT — PATIENT HEALTH QUESTIONNAIRE - PHQ9
2. FEELING DOWN, DEPRESSED OR HOPELESS: 0
SUM OF ALL RESPONSES TO PHQ QUESTIONS 1-9: 0
SUM OF ALL RESPONSES TO PHQ QUESTIONS 1-9: 0
SUM OF ALL RESPONSES TO PHQ9 QUESTIONS 1 & 2: 0
1. LITTLE INTEREST OR PLEASURE IN DOING THINGS: 0
SUM OF ALL RESPONSES TO PHQ QUESTIONS 1-9: 0
SUM OF ALL RESPONSES TO PHQ QUESTIONS 1-9: 0

## 2023-02-13 ASSESSMENT — ENCOUNTER SYMPTOMS
CONSTIPATION: 0
SHORTNESS OF BREATH: 0
DIARRHEA: 0
COUGH: 0
COLOR CHANGE: 0
CHEST TIGHTNESS: 0
EYE ITCHING: 0
BACK PAIN: 0
SINUS PRESSURE: 0
SINUS PAIN: 0
WHEEZING: 0
NAUSEA: 0
ABDOMINAL PAIN: 0
RHINORRHEA: 0
EYE DISCHARGE: 0

## 2023-02-13 NOTE — ASSESSMENT & PLAN NOTE
At goal, continue current medications and continue current treatment plan continue current dose of Synthroid 150 mcg daily. We will recheck TSH and free T4 in the future.

## 2023-02-13 NOTE — ASSESSMENT & PLAN NOTE
This is been longstanding, was on finasteride prior which she states seemed to help. We will put her back on finasteride 5 mg once daily. Side effects this medication are discussed, she verbalized understanding of this.

## 2023-02-13 NOTE — PROGRESS NOTES
Well Adult Note  Name: Jana Pang Date: 2023   MRN: [id number] Sex: Female   Age: 49 y.o. Ethnicity: Unavailable / Unknown   : 1975 Race: White (non-)      Alaina Weston is here for well adult exam.  History:  Type 2 diabetes, hair loss, hypothyroidism      Review of Systems   Constitutional:  Negative for chills, fatigue, fever and unexpected weight change. HENT:  Negative for congestion, ear pain, rhinorrhea, sinus pressure and sinus pain. Eyes:  Negative for discharge, itching and visual disturbance. Respiratory:  Negative for cough, chest tightness, shortness of breath and wheezing. Cardiovascular:  Negative for chest pain, palpitations and leg swelling. Gastrointestinal:  Negative for abdominal pain, constipation, diarrhea and nausea. Endocrine: Negative for cold intolerance, heat intolerance, polydipsia and polyphagia. Genitourinary:  Negative for dysuria, flank pain and frequency. Musculoskeletal:  Negative for arthralgias, back pain and myalgias. Skin:  Negative for color change. Allergic/Immunologic: Negative for environmental allergies and food allergies. Neurological:  Negative for dizziness, weakness, light-headedness, numbness and headaches. Psychiatric/Behavioral:  Negative for agitation, behavioral problems and confusion. The patient is not nervous/anxious. Allergies   Allergen Reactions    Amoxicillin          Prior to Visit Medications    Medication Sig Taking?  Authorizing Provider   finasteride (PROSCAR) 5 MG tablet Take 1 tablet by mouth daily Yes JAXON Gorman CNP   cephALEXin (KEFLEX) 500 MG capsule Take 1 capsule by mouth 4 times daily for 10 days Yes JAXON Gorman CNP   metFORMIN (GLUCOPHAGE) 1000 MG tablet Take 1 tablet by mouth 2 times daily (with meals) Yes JAXON Gorman CNP   metoprolol succinate (TOPROL XL) 50 MG extended release tablet Take 1 tablet by mouth daily Yes JAXON Gorman CNP levothyroxine (SYNTHROID) 150 MCG tablet Take 1 tablet by mouth Daily Yes Summer Flattery, APRN - CNP   ketoconazole (NIZORAL) 2 % cream Apply topically three times a day Yes Bianca Chase,    clobetasol (TEMOVATE) 0.05 % external solution Apply topically 2 times daily. Yes JAXON Theodore CNP   clobetasol (TEMOVATE) 0.05 % external solution Apply topically 2 times daily. Yes Estelle Dickens MD   albuterol sulfate HFA (PROVENTIL HFA) 108 (90 Base) MCG/ACT inhaler Inhale 2 puffs into the lungs every 6 hours as needed for Wheezing Any albuterol inhaler ok to dispense Yes JAXON Theodore CNP   b complex vitamins capsule Take 1 capsule by mouth daily Yes Historical Provider, MD   VITAMIN A PO Take by mouth Yes Historical Provider, MD   Cholecalciferol (VITAMIN D3) 50 MCG (2000 UT) CAPS Take by mouth Yes Historical Provider, MD   MAGNESIUM-ZINC PO Take by mouth Also has calcium Yes Historical Provider, MD   blood glucose test strips (FREESTYLE LITE) strip 1 each by In Vitro route daily As needed. Summer Flattery, APRN Cristiana CNP   FreeStyle Lancets MISC USE 1 TO CHECK GLUCOSE ONCE DAILY  Historical Provider, MD   glucose monitoring (FREESTYLE FREEDOM) kit 1 kit by Does not apply route daily  Summer FlatteryJAXON - CNP   ketoconazole (NIZORAL) 2 % shampoo SHAMPOO THREE TIMES WEEKLY TO SCALP AND RINSE OFF.   Patient not taking: Reported on 2023  Historical Provider, MD   Iron, Ferrous Sulfate, 325 (65 Fe) MG TABS Take by mouth  Patient not taking: Reported on 2023  Historical Provider, MD         Past Medical History:   Diagnosis Date    Hashimoto's thyroiditis     Irregular menses     history of     Pilar cyst     Skin lesion of scalp     Skin tag     Thyroid cancer ([facility] [address]     Thyroid cancer ([facility] [address] 2014       Past Surgical History:   Procedure Laterality Date     SECTION      x2    CYST REMOVAL      from head     THYROIDECTOMY, PARTIAL      x2    TUBAL LIGATION      WISDOM TOOTH EXTRACTION 1992-93         Family History   Problem Relation Age of Onset    High Blood Pressure Mother     Diabetes Mother         pre diabetes    Cancer Father         Skin and pancreatic    Heart Disease Father     Heart Attack Father         x3 prior to his death at age of 72     Prostate Cancer Father     Breast Cancer Maternal Grandmother         great-grandmother    Diabetes Maternal Aunt     Diabetes Paternal Aunt     Other Daughter         reactive airway disorder        Social History     Tobacco Use    Smoking status: Never    Smokeless tobacco: Never   Substance Use Topics    Alcohol use: Yes     Comment: 2 drinks per week    Drug use: No       Objective   /74 (Site: Right Upper Arm, Position: Sitting)   Pulse 85   Wt 180 lb (81.6 kg)   SpO2 99%   BMI 29.05 kg/m²   Wt Readings from Last 3 Encounters:   02/13/23 180 lb (81.6 kg)   12/19/22 191 lb 9.6 oz (86.9 kg)   11/25/22 202 lb (91.6 kg)     There were no vitals filed for this visit. Physical Exam  Vitals and nursing note reviewed. Constitutional:       General: She is not in acute distress. Appearance: Normal appearance. She is not ill-appearing. HENT:      Head: Normocephalic and atraumatic. Right Ear: Tympanic membrane and external ear normal.      Left Ear: Tympanic membrane and external ear normal.      Nose: Nose normal. No congestion or rhinorrhea. Mouth/Throat:      Mouth: Mucous membranes are moist.      Pharynx: Oropharynx is clear. No posterior oropharyngeal erythema. Eyes:      Pupils: Pupils are equal, round, and reactive to light. Cardiovascular:      Rate and Rhythm: Normal rate and regular rhythm. Pulses: Normal pulses. Heart sounds: Normal heart sounds. Pulmonary:      Effort: Pulmonary effort is normal.      Breath sounds: Normal breath sounds. No wheezing or rhonchi. Abdominal:      General: Bowel sounds are normal.      Palpations: There is no mass. Tenderness:  There is no abdominal tenderness. Musculoskeletal:         General: No swelling or tenderness. Normal range of motion. Cervical back: Normal range of motion. No rigidity or tenderness. Skin:     General: Skin is warm and dry. Capillary Refill: Capillary refill takes less than 2 seconds. Coloration: Skin is not pale. Findings: No erythema. Neurological:      General: No focal deficit present. Mental Status: She is alert and oriented to person, place, and time. Psychiatric:         Mood and Affect: Mood normal.         Behavior: Behavior normal.         Thought Content: Thought content normal.         Judgment: Judgment normal.         Assessment   Plan   1. Encounter for well adult exam without abnormal findings  2. Type 2 diabetes mellitus without complication, without long-term current use of insulin ([facility])  Assessment & Plan:   Continue metformin, will draw C-peptide to differentiate type I versus type II. I have low suspicion that she has type I as her blood sugars are somewhat controlled. The average is 130 per her glucometer. We will consider oral antidiabetic medications moving forward after we repeat A1c. She is to continue diet low in carbohydrates, exercise as tolerated with a goal of 30 minutes a day 150 minutes a week. Orders:  -      DIABETES FOOT EXAM  -     Hemoglobin A1C; Future  -     C-Peptide; Future  3. Hair loss  Assessment & Plan: This is been longstanding, was on finasteride prior which she states seemed to help. We will put her back on finasteride 5 mg once daily. Side effects this medication are discussed, she verbalized understanding of this. Orders:  -     finasteride (PROSCAR) 5 MG tablet; Take 1 tablet by mouth daily, Disp-90 tablet, R-1Normal  4. Infected cyst of skin  -     cephALEXin (KEFLEX) 500 MG capsule; Take 1 capsule by mouth 4 times daily for 10 days, Disp-40 capsule, R-0Normal  5.  Hypothyroidism due to Hashimoto's thyroiditis  Assessment & Plan:   At goal, continue current medications and continue current treatment plan continue current dose of Synthroid 150 mcg daily. We will recheck TSH and free T4 in the future. Orders:  -     TSH; Future  -     T4, Free; Future  6. Essential hypertension  Assessment & Plan:   At goal, continue current medications and continue current treatment plan continue on Toprol XL 50 mg tablets once daily. Continue to monitor blood pressure and heart rate and report these the office. She denies any side effects to his current medication. Orders:  -     CBC with Auto Differential; Future  -     Comprehensive Metabolic Panel, Fasting; Future  7. Thyroid cancer ([facility] [address]  -     TSH; Future  -     T4, Free; Future  8. Lipid screening  -     Lipid, Fasting; Future  9. Hyperglycemia  -     C-Peptide;  Future       Personalized Preventive Plan   Current Health Maintenance Status  Immunization History   Administered Date(s) Administered    COVID-19, PFIZER Bivalent BOOSTER, DO NOT Dilute, (age 12y+), IM, 30 mcg/0.3 mL 10/04/2022    COVID-19, PFIZER PURPLE top, DILUTE for use, (age 15 y+), 30mcg/0.3mL 04/16/2021, 05/07/2021, 12/05/2021    Influenza Virus Vaccine 11/20/2014, 09/25/2015, 11/17/2018    Influenza, FLUARIX, FLULAVAL, [place] (age 10 mo+) AND AFLURIA, (age 1 y+), PF, 0.5mL 10/07/2016, 10/04/2017    Influenza, FLUCELVAX, (age 10 mo+), MDCK, PF, 0.5mL 11/17/2018, 01/10/2022, 09/21/2022    Tdap (Boostrix, Adacel) 09/21/2022    Tetanus 07/15/1993    Tetanus Toxoid, absorbed 07/15/1993        Health Maintenance   Topic Date Due    Diabetic Alb to Cr ratio (uACR) test  Never done    Diabetic retinal exam  Never done    Colorectal Cancer Screen  04/15/2019    Hepatitis B vaccine (1 of 3 - Risk 3-dose series) 12/19/2023 (Originally 4/15/1993)    Pneumococcal 0-64 years Vaccine (1 - PCV) 12/19/2023 (Originally 4/15/1980)    Depression Screen  09/13/2023    Lipids  09/19/2023    GFR test (Diabetes, CKD 3-4, OR last GFR 15-59)  11/17/2023    A1C test (Diabetic or Prediabetic)  12/19/2023    Diabetic foot exam  02/13/2024    Cervical cancer screen  09/21/2025    DTaP/Tdap/Td vaccine (2 - Td or Tdap) 09/21/2032    Flu vaccine  Completed    COVID-19 Vaccine  Completed    Hepatitis C screen  Completed    HIV screen  Completed    Hepatitis A vaccine  Aged Out    Hib vaccine  Aged Out    Meningococcal (ACWY) vaccine  Aged Out     Recommendations for Y[place]a Due: see orders and patient instructions/AVS.    Return in about 6 months (around 8/13/2023), or if symptoms worsen or fail to improve.

## 2023-02-13 NOTE — ASSESSMENT & PLAN NOTE
Continue metformin, will draw C-peptide to differentiate type I versus type II. I have low suspicion that she has type I as her blood sugars are somewhat controlled. The average is 130 per her glucometer. We will consider oral antidiabetic medications moving forward after we repeat A1c. She is to continue diet low in carbohydrates, exercise as tolerated with a goal of 30 minutes a day 150 minutes a week.

## 2023-02-13 NOTE — ASSESSMENT & PLAN NOTE
At goal, continue current medications and continue current treatment plan continue on Toprol XL 50 mg tablets once daily. Continue to monitor blood pressure and heart rate and report these the office. She denies any side effects to his current medication.

## 2023-02-13 NOTE — PATIENT INSTRUCTIONS
Well Visit, Ages 25 to 72: Care Instructions  Well visits can help you stay healthy. Your doctor has checked your overall health and may have suggested ways to take good care of yourself. Your doctor also may have recommended tests. You can help prevent illness with healthy eating, good sleep, vaccinations, regular exercise, and other steps. Get the tests that you and your doctor decide on. Depending on your age and risks, examples might include screening for diabetes; hepatitis C; HIV; and cervical, breast, lung, and colon cancer. Screening helps find diseases before any symptoms appear. Eat healthy foods. Choose fruits, vegetables, whole grains, lean protein, and low-fat dairy foods. Limit saturated fat and reduce salt. Limit alcohol. Men should have no more than 2 drinks a day. Women should have no more than 1. For some people, no alcohol is the best choice. Exercise. Get at least 30 minutes of exercise on most days of the week. Walking can be a good choice. Reach and stay at your healthy weight. This will lower your risk for many health problems. Take care of your mental health. Try to stay connected with friends, family, and community, and find ways to manage stress. If you're feeling depressed or hopeless, talk to someone. A counselor can help. If you don't have a counselor, talk to your doctor. Talk to your doctor if you think you may have a problem with alcohol or drug use. This includes prescription medicines and illegal drugs. Avoid tobacco and nicotine: Don't smoke, vape, or chew. If you need help quitting, talk to your doctor. Practice safer sex. Getting tested, using condoms or dental dams, and limiting sex partners can help prevent STIs. Use birth control if it's important to you to prevent pregnancy. Talk with your doctor about your choices and what might be best for you. Prevent problems where you can.  Protect your skin from too much sun, wash your hands, brush your teeth twice a day, and wear a seat belt in the car. Where can you learn more? Go to http://www.srinivasan.com/ and enter P072 to learn more about \"Well Visit, Ages 25 to 72: Care Instructions. \"  Current as of: March 9, 2022               Content Version: 13.5  © 3024-7950 [employer], [employer]. Care instructions adapted under license by [facility] ([facility]). If you have questions about a medical condition or this instruction, always ask your healthcare professional. [address] any warranty or liability for your use of this information.

## 2023-04-29 LAB
ALBUMIN/GLOBULIN RATIO: 1.6 G/DL
ALBUMIN: 4.6 G/DL (ref 3.5–5)
ALP BLD-CCNC: 67 UNITS/L (ref 38–126)
ALT SERPL-CCNC: 22 UNITS/L (ref 4–35)
ANION GAP SERPL CALCULATED.3IONS-SCNC: 8.5 MMOL/L
AST SERPL-CCNC: 21 UNITS/L (ref 14–36)
BASOPHILS %: 1.07 (ref 0–3)
BASOPHILS ABSOLUTE: 0.07 (ref 0–0.3)
BILIRUB SERPL-MCNC: 0.6 MG/DL (ref 0.2–1.3)
BUN BLDV-MCNC: 15 MG/DL (ref 7–17)
CALCIUM SERPL-MCNC: 9.8 MG/DL (ref 8.4–10.2)
CHLORIDE BLD-SCNC: 105 MMOL/L (ref 98–120)
CHOLESTEROL/HDL RATIO: 3.35 RATIO (ref 0–4.5)
CHOLESTEROL: 171 MG/DL (ref 50–200)
CO2: 25 MMOL/L (ref 22–31)
CREAT SERPL-MCNC: 0.7 MG/DL (ref 0.5–1)
EOSINOPHILS %: 2.12 (ref 0–10)
EOSINOPHILS ABSOLUTE: 0.13 (ref 0–1.1)
GFR CALCULATED: > 60
GLOBULIN: 2.8 G/DL
GLUCOSE: 159 MG/DL (ref 65–105)
HBA1C MFR BLD: 6 % (ref 4.4–6.4)
HCT VFR BLD CALC: 42.9 % (ref 37–47)
HDLC SERPL-MCNC: 51 MG/DL (ref 36–68)
HEMOGLOBIN: 14 (ref 12–16)
LDL CHOLESTEROL CALCULATED: 93.8 MG/DL (ref 0–160)
LYMPHOCYTE %: 29.67 (ref 20–51.1)
LYMPHOCYTES ABSOLUTE: 1.82 (ref 1–5.5)
MCH RBC QN AUTO: 27.1 PG (ref 28.5–32.5)
MCHC RBC AUTO-ENTMCNC: 32.6 G/DL (ref 32–37)
MCV RBC AUTO: 83.2 FL (ref 80–94)
MONOCYTES %: 6.73 (ref 1.7–9.3)
MONOCYTES ABSOLUTE: 0.41 (ref 0.1–1)
NEUTROPHILS %: 60.42 (ref 42.2–75.2)
NEUTROPHILS ABSOLUTE: 3.71 (ref 2–8.1)
PDW BLD-RTO: 14.2 % (ref 8.5–15.5)
PLATELET # BLD: 319.9 THOU/MM3 (ref 130–400)
POTASSIUM SERPL-SCNC: 4.4 MMOL/L (ref 3.6–5)
RBC: 5.16 M/UL (ref 4.2–5.4)
SODIUM BLD-SCNC: 138 MMOL/L (ref 135–145)
T4 FREE: 2.3 NG/DL (ref 0.78–2.19)
TOTAL PROTEIN, SERUM: 7.4 G/DL (ref 6.3–8.2)
TRIGL SERPL-MCNC: 131 MG/DL (ref 10–250)
TSH SERPL DL<=0.05 MIU/L-ACNC: <0.02 MIU/ML (ref 0.49–4.67)
VLDLC SERPL CALC-MCNC: 26 MG/DL (ref 0–50)
WBC: 6.1 THOU/ML3 (ref 4.8–10.8)

## 2023-04-30 LAB — C-PEPTIDE: 2.2 NG/ML (ref 1.1–4.4)

## 2023-05-01 RX ORDER — ALBUTEROL SULFATE 90 UG/1
2 AEROSOL, METERED RESPIRATORY (INHALATION) EVERY 6 HOURS PRN
Qty: 3 EACH | Refills: 1 | Status: SHIPPED | OUTPATIENT
Start: 2023-05-01

## 2023-05-25 DIAGNOSIS — I10 ESSENTIAL HYPERTENSION: ICD-10-CM

## 2023-05-25 RX ORDER — METOPROLOL SUCCINATE 50 MG/1
TABLET, EXTENDED RELEASE ORAL
Qty: 90 TABLET | Refills: 3 | Status: SHIPPED | OUTPATIENT
Start: 2023-05-25

## 2023-05-25 NOTE — TELEPHONE ENCOUNTER
Silvana Jacinto is requesting a refill on the following medication(s):  Requested Prescriptions     Pending Prescriptions Disp Refills    metoprolol succinate (TOPROL XL) 50 MG extended release tablet [Pharmacy Med Name: METOPROLOL SUCCINATE ER TABS 50MG] 90 tablet 3     Sig: TAKE 1 TABLET DAILY       Last Visit Date (If Applicable):  5/51/3576    Next Visit Date:    6/20/2023

## 2023-05-29 DIAGNOSIS — E11.9 TYPE 2 DIABETES MELLITUS WITHOUT COMPLICATION, WITHOUT LONG-TERM CURRENT USE OF INSULIN (HCC): ICD-10-CM

## 2023-06-20 ENCOUNTER — OFFICE VISIT (OUTPATIENT)
Dept: FAMILY MEDICINE CLINIC | Age: 48
End: 2023-06-20
Payer: COMMERCIAL

## 2023-06-20 VITALS
BODY MASS INDEX: 26.95 KG/M2 | WEIGHT: 167 LBS | OXYGEN SATURATION: 100 % | SYSTOLIC BLOOD PRESSURE: 128 MMHG | HEART RATE: 52 BPM | DIASTOLIC BLOOD PRESSURE: 80 MMHG

## 2023-06-20 DIAGNOSIS — E11.9 TYPE 2 DIABETES MELLITUS WITHOUT COMPLICATION, WITHOUT LONG-TERM CURRENT USE OF INSULIN (HCC): Primary | ICD-10-CM

## 2023-06-20 LAB — HBA1C MFR BLD: 6.3 %

## 2023-06-20 PROCEDURE — 3078F DIAST BP <80 MM HG: CPT

## 2023-06-20 PROCEDURE — 3074F SYST BP LT 130 MM HG: CPT

## 2023-06-20 PROCEDURE — 83037 HB GLYCOSYLATED A1C HOME DEV: CPT

## 2023-06-20 PROCEDURE — 3044F HG A1C LEVEL LT 7.0%: CPT

## 2023-06-20 PROCEDURE — 99213 OFFICE O/P EST LOW 20 MIN: CPT

## 2023-06-20 NOTE — PROGRESS NOTES
Ren Sol (:  1975) is a 51 y.o. female,Established patient, here for evaluation of the following chief complaint(s):  Diabetes (Patient is here today for a follow up for diabetes. She does check sugars at home and they do run high still. She did bring he logs today.)         ASSESSMENT/PLAN:  1. Type 2 diabetes mellitus without complication, without long-term current use of insulin ([facility])  Assessment & Plan:   At goal, changes made today: Start Jardiance 10 mg tablets 1 tablet mouth daily. Side effects this medication are discussed. Continue metformin 1000 mg tablets 1 tablet by mouth twice daily with meals. Did review A1c and it is in goal range, however reviewing blood glucose logs average blood glucose is above 140 but below 180. Encouraged exercise 30 minutes a day goal 150 minutes a week. We will continue to monitor blood sugar 3 times daily and as needed as well as monitoring A1c every 3 months. Orders:  -     POCT Hb A1C (glycosylated hemoglobin)  -     empagliflozin (JARDIANCE) 10 MG tablet; Take 1 tablet by mouth daily, Disp-30 tablet, R-1Normal      Return in about 6 months (around 2023), or if symptoms worsen or fail to improve. Subjective   SUBJECTIVE/OBJECTIVE:  Diabetes  She presents for her follow-up diabetic visit. She has type 2 diabetes mellitus. Her disease course has been stable. There are no hypoglycemic associated symptoms. Pertinent negatives for hypoglycemia include no confusion, dizziness, headaches or nervousness/anxiousness. There are no diabetic associated symptoms. Pertinent negatives for diabetes include no chest pain, no fatigue, no polydipsia, no polyphagia and no weakness. There are no hypoglycemic complications. Symptoms are stable. Current diabetic treatment includes oral agent (monotherapy). She is compliant with treatment all of the time. She is following a diabetic and generally healthy diet. When asked about meal planning, she reported none.

## 2023-06-22 ASSESSMENT — ENCOUNTER SYMPTOMS
CHEST TIGHTNESS: 0
EYE ITCHING: 0
RHINORRHEA: 0
SHORTNESS OF BREATH: 0
SINUS PAIN: 0
BACK PAIN: 0
COUGH: 0
ABDOMINAL PAIN: 0
NAUSEA: 0
COLOR CHANGE: 0
WHEEZING: 0
EYE DISCHARGE: 0
SINUS PRESSURE: 0
DIARRHEA: 0
CONSTIPATION: 0

## 2023-06-22 NOTE — ASSESSMENT & PLAN NOTE
At goal, changes made today: Start Jardiance 10 mg tablets 1 tablet mouth daily. Side effects this medication are discussed. Continue metformin 1000 mg tablets 1 tablet by mouth twice daily with meals. Did review A1c and it is in goal range, however reviewing blood glucose logs average blood glucose is above 140 but below 180. Encouraged exercise 30 minutes a day goal 150 minutes a week. We will continue to monitor blood sugar 3 times daily and as needed as well as monitoring A1c every 3 months.

## 2023-07-20 DIAGNOSIS — L65.9 HAIR LOSS: ICD-10-CM

## 2023-07-20 RX ORDER — FINASTERIDE 5 MG/1
TABLET, FILM COATED ORAL
Qty: 90 TABLET | Refills: 3 | Status: SHIPPED | OUTPATIENT
Start: 2023-07-20

## 2023-08-09 DIAGNOSIS — E11.9 TYPE 2 DIABETES MELLITUS WITHOUT COMPLICATION, WITHOUT LONG-TERM CURRENT USE OF INSULIN (HCC): ICD-10-CM

## 2023-08-09 RX ORDER — EMPAGLIFLOZIN 10 MG/1
TABLET, FILM COATED ORAL
Qty: 60 TABLET | Refills: 5 | Status: SHIPPED | OUTPATIENT
Start: 2023-08-09

## 2023-08-09 NOTE — TELEPHONE ENCOUNTER
Benito Wolfe is requesting a refill on the following medication(s):  Requested Prescriptions     Pending Prescriptions Disp Refills    JARDIANCE 10 MG tablet [Pharmacy Med Name: Melissa Alonzo 10MG] 60 tablet 5     Sig: TAKE 1 TABLET DAILY       Last Visit Date (If Applicable):  9/97/4808    Next Visit Date:    12/20/2023

## 2023-09-04 DIAGNOSIS — E89.0 POSTOPERATIVE HYPOTHYROIDISM: ICD-10-CM

## 2023-09-05 RX ORDER — LEVOTHYROXINE SODIUM 0.15 MG/1
TABLET ORAL
Qty: 90 TABLET | Refills: 3 | Status: SHIPPED | OUTPATIENT
Start: 2023-09-05

## 2023-09-05 NOTE — TELEPHONE ENCOUNTER
Brock Craft is requesting a refill on the following medication(s):  Requested Prescriptions     Pending Prescriptions Disp Refills    levothyroxine (SYNTHROID) 150 MCG tablet [Pharmacy Med Name: L-THYROXINE (SYNTHROID) TABS 150MCG] 90 tablet 3     Sig: TAKE 1 TABLET DAILY       Last Visit Date (If Applicable):  2/34/6074    Next Visit Date:    12/20/2023

## 2024-01-08 ENCOUNTER — OFFICE VISIT (OUTPATIENT)
Dept: FAMILY MEDICINE CLINIC | Age: 49
End: 2024-01-08
Payer: COMMERCIAL

## 2024-01-08 VITALS
DIASTOLIC BLOOD PRESSURE: 82 MMHG | HEART RATE: 59 BPM | OXYGEN SATURATION: 100 % | BODY MASS INDEX: 26.03 KG/M2 | WEIGHT: 162 LBS | HEIGHT: 66 IN | SYSTOLIC BLOOD PRESSURE: 136 MMHG

## 2024-01-08 DIAGNOSIS — I10 ESSENTIAL HYPERTENSION: ICD-10-CM

## 2024-01-08 DIAGNOSIS — E11.9 TYPE 2 DIABETES MELLITUS WITHOUT COMPLICATION, WITHOUT LONG-TERM CURRENT USE OF INSULIN (HCC): Primary | ICD-10-CM

## 2024-01-08 DIAGNOSIS — Z13.220 ENCOUNTER FOR LIPID SCREENING FOR CARDIOVASCULAR DISEASE: ICD-10-CM

## 2024-01-08 DIAGNOSIS — Z13.6 ENCOUNTER FOR LIPID SCREENING FOR CARDIOVASCULAR DISEASE: ICD-10-CM

## 2024-01-08 DIAGNOSIS — E89.0 POSTOPERATIVE HYPOTHYROIDISM: ICD-10-CM

## 2024-01-08 DIAGNOSIS — L30.9 DERMATITIS: ICD-10-CM

## 2024-01-08 LAB — HBA1C MFR BLD: 7 %

## 2024-01-08 PROCEDURE — 99214 OFFICE O/P EST MOD 30 MIN: CPT

## 2024-01-08 PROCEDURE — 3051F HG A1C>EQUAL 7.0%<8.0%: CPT

## 2024-01-08 PROCEDURE — 3075F SYST BP GE 130 - 139MM HG: CPT

## 2024-01-08 PROCEDURE — 83036 HEMOGLOBIN GLYCOSYLATED A1C: CPT

## 2024-01-08 PROCEDURE — 3079F DIAST BP 80-89 MM HG: CPT

## 2024-01-08 RX ORDER — BETAMETHASONE VALERATE 0.1 %
LOTION (ML) TOPICAL
Qty: 60 ML | Refills: 0 | Status: SHIPPED | OUTPATIENT
Start: 2024-01-08

## 2024-01-08 ASSESSMENT — PATIENT HEALTH QUESTIONNAIRE - PHQ9
1. LITTLE INTEREST OR PLEASURE IN DOING THINGS: 0
SUM OF ALL RESPONSES TO PHQ QUESTIONS 1-9: 0
SUM OF ALL RESPONSES TO PHQ QUESTIONS 1-9: 0
2. FEELING DOWN, DEPRESSED OR HOPELESS: 0
SUM OF ALL RESPONSES TO PHQ9 QUESTIONS 1 & 2: 0
SUM OF ALL RESPONSES TO PHQ QUESTIONS 1-9: 0
SUM OF ALL RESPONSES TO PHQ QUESTIONS 1-9: 0

## 2024-01-08 NOTE — PROGRESS NOTES
Anne Tate (:  1975) is a 48 y.o. female,Established patient, here for evaluation of the following chief complaint(s):  Diabetes (Patient is here today for a follow up for diabetes. )         ASSESSMENT/PLAN:  1. Type 2 diabetes mellitus without complication, without long-term current use of insulin ([facility])  Assessment & Plan:   At goal, continue current medications and continue current treatment plan A1c is increased from last check, we will continue current medications as she states her diet has been off.  Will recheck A1c in 6 months, decrease carbohydrates.  Orders:  -     POCT Hb A1C (glycosylated hemoglobin)  -     Comprehensive Metabolic Panel, Fasting; Future  -     CBC with Auto Differential; Future  2. Essential hypertension  -     Comprehensive Metabolic Panel, Fasting; Future  -     CBC with Auto Differential; Future  3. Postoperative hypothyroidism  Assessment & Plan:   At goal, continue current medications pending work up below will need to recheck on thyroid hormones to ensure euthyroid.  At this time continue current dose of Synthroid at 150 mcg.  May need to titrate this down as last TSH was notably reduced.  Orders:  -     TSH; Future  -     T4, Free; Future  4. Encounter for lipid screening for cardiovascular disease  -     Lipid, Fasting; Future  5. Dermatitis  -     Amb External Referral To Dermatology  -     betamethasone valerate (VALISONE) 0.1 % lotion; Apply topically 2 times daily., Disp-60 mL, R-0, Normal  -Chronic rash widespread does appear to have possibly fungal infection likely due to increased blood sugars.  She would like to see dermatology at the same clinic that her daughter goes through.  Will refer to dermatology    Return in about 6 months (around 2024), or if symptoms worsen or fail to improve.         Subjective   SUBJECTIVE/OBJECTIVE:  Diabetes  She presents for her follow-up diabetic visit. She has type 2 diabetes mellitus. Her disease course has been

## 2024-01-10 PROBLEM — E11.69 TYPE 2 DIABETES MELLITUS WITH OBESITY (HCC): Status: ACTIVE | Noted: 2022-11-16

## 2024-01-10 PROBLEM — E66.9 TYPE 2 DIABETES MELLITUS WITH OBESITY (HCC): Status: ACTIVE | Noted: 2022-11-16

## 2024-01-10 ASSESSMENT — ENCOUNTER SYMPTOMS
EYE DISCHARGE: 0
SINUS PRESSURE: 0
SINUS PAIN: 0
RHINORRHEA: 0
NAUSEA: 0
DIARRHEA: 0
EYE ITCHING: 0
ABDOMINAL PAIN: 0
BACK PAIN: 0
COLOR CHANGE: 0
CHEST TIGHTNESS: 0
COUGH: 0
WHEEZING: 0
SHORTNESS OF BREATH: 0
CONSTIPATION: 0

## 2024-01-10 NOTE — ASSESSMENT & PLAN NOTE
At goal, continue current medications pending work up below will need to recheck on thyroid hormones to ensure euthyroid.  At this time continue current dose of Synthroid at 150 mcg.  May need to titrate this down as last TSH was notably reduced.

## 2024-01-10 NOTE — ASSESSMENT & PLAN NOTE
At goal, continue current medications and continue current treatment plan A1c is increased from last check, we will continue current medications as she states her diet has been off.  Will recheck A1c in 6 months, decrease carbohydrates.

## 2024-01-18 ENCOUNTER — PATIENT MESSAGE (OUTPATIENT)
Dept: FAMILY MEDICINE CLINIC | Age: 49
End: 2024-01-18

## 2024-01-18 NOTE — TELEPHONE ENCOUNTER
From: Anne Tate  To: Naun Doran  Sent: 1/18/2024 9:21 AM EST  Subject: VERY high blood sugar    Hello - I started taking Januvia on Thursday (January 11) after my visit with you on January 8. My blood sugar has been extremely high this week, see attached summary. Prior to Christmas my BS was VERY good, rarely above 150 & almost never close to 200. This week I can barely get it below 200 & it is horribly high in the morning (fasting) & even after breakfast.  As I mentioned during my visit, I still feel like I am fighting a cold (still all sinus related) & the lymph nodes in the back of my neck are enlarged.   Does Januvia take time before it is effective? My diet has not changed this week, in fact I have been eating fewer carbs even than normal trying to bring my BS down. I have not been able get out & walk much due to the frigid temps but I've been doing some indoor excursing but it is not as much as my normal...   Any suggestions?   Thank you!  Ellyn Tate

## 2024-01-18 NOTE — TELEPHONE ENCOUNTER
He can take up to a week or so however lets increase Januvia to 2 tabs 100 mg once daily tomorrow starting this.  Continue to monitor blood sugars.  It is likely that your illness is causing increase in blood sugar.  I want to make sure that your blood sugar does not become too low so please let me know if you start noticing unsteady drops.  Thank you

## 2024-01-20 LAB
ALBUMIN/GLOBULIN RATIO: 1.8 G/DL
ALBUMIN: 4.9 G/DL (ref 3.5–5)
ALP BLD-CCNC: 60 UNITS/L (ref 38–126)
ALT SERPL-CCNC: 24 UNITS/L (ref 4–35)
ANION GAP SERPL CALCULATED.3IONS-SCNC: 6.2 MMOL/L (ref 12–16)
AST SERPL-CCNC: 25 UNITS/L (ref 14–36)
BASOPHILS %: 1.34 (ref 0–3)
BASOPHILS ABSOLUTE: 0.05 (ref 0–0.3)
BILIRUB SERPL-MCNC: 0.7 MG/DL (ref 0.2–1.3)
BUN BLDV-MCNC: 15 MG/DL (ref 7–17)
CALCIUM SERPL-MCNC: 9.7 MG/DL (ref 8.4–10.2)
CHLORIDE BLD-SCNC: 105 MMOL/L (ref 98–120)
CHOLESTEROL/HDL RATIO: 5.02 RATIO (ref 0–4.5)
CHOLESTEROL: 241 MG/DL (ref 50–200)
CO2: 28 MMOL/L (ref 22–31)
CREAT SERPL-MCNC: 0.7 MG/DL (ref 0.5–1)
EOSINOPHILS %: 3.94 (ref 0–10)
EOSINOPHILS ABSOLUTE: 0.16 (ref 0–1.1)
GFR CALCULATED: > 60
GLOBULIN: 2.7 G/DL
GLUCOSE: 201 MG/DL (ref 65–105)
HCT VFR BLD CALC: 47.9 % (ref 37–47)
HDLC SERPL-MCNC: 48 MG/DL (ref 36–68)
HEMOGLOBIN: 14.6 (ref 12–16)
LDL CHOLESTEROL CALCULATED: 169.4 MG/DL (ref 0–160)
LYMPHOCYTE %: 40.9 (ref 20–51.1)
LYMPHOCYTES ABSOLUTE: 1.67 (ref 1–5.5)
MCH RBC QN AUTO: 26.9 PG (ref 28.5–32.5)
MCHC RBC AUTO-ENTMCNC: 30.4 G/DL (ref 32–37)
MCV RBC AUTO: 88.4 FL (ref 80–94)
MONOCYTES %: 8.52 (ref 1.7–9.3)
MONOCYTES ABSOLUTE: 0.35 (ref 0.1–1)
NEUTROPHILS %: 45.29 (ref 42.2–75.2)
NEUTROPHILS ABSOLUTE: 1.85 (ref 2–8.1)
PDW BLD-RTO: 12.9 % (ref 8.5–15.5)
PLATELET # BLD: 259.7 THOU/MM3 (ref 130–400)
POTASSIUM SERPL-SCNC: 4.1 MMOL/L (ref 3.6–5)
RBC: 5.42 M/UL (ref 4.2–5.4)
SODIUM BLD-SCNC: 139 MMOL/L (ref 135–145)
T4 FREE: 2.5 NG/DL (ref 0.78–2.19)
TOTAL PROTEIN, SERUM: 7.6 G/DL (ref 6.3–8.2)
TRIGL SERPL-MCNC: 118 MG/DL (ref 10–250)
TSH SERPL DL<=0.05 MIU/L-ACNC: <0.02 MIU/ML (ref 0.49–4.67)
VLDLC SERPL CALC-MCNC: 24 MG/DL (ref 0–50)
WBC: 4.1 THOU/ML3 (ref 4.8–10.8)

## 2024-01-22 DIAGNOSIS — E89.0 POSTOPERATIVE HYPOTHYROIDISM: Primary | ICD-10-CM

## 2024-02-02 ENCOUNTER — TELEPHONE (OUTPATIENT)
Dept: FAMILY MEDICINE CLINIC | Age: 49
End: 2024-02-02

## 2024-02-02 DIAGNOSIS — E11.69 TYPE 2 DIABETES MELLITUS WITH OBESITY (HCC): Primary | ICD-10-CM

## 2024-02-02 DIAGNOSIS — E66.9 TYPE 2 DIABETES MELLITUS WITH OBESITY (HCC): Primary | ICD-10-CM

## 2024-02-02 RX ORDER — INSULIN GLARGINE 300 U/ML
10 INJECTION, SOLUTION SUBCUTANEOUS NIGHTLY
Qty: 1 ADJUSTABLE DOSE PRE-FILLED PEN SYRINGE | Refills: 0 | Status: SHIPPED | OUTPATIENT
Start: 2024-02-02 | End: 2024-02-05

## 2024-02-02 RX ORDER — PEN NEEDLE, DIABETIC 32GX 5/32"
1 NEEDLE, DISPOSABLE MISCELLANEOUS DAILY
Qty: 100 EACH | Refills: 3 | Status: SHIPPED | OUTPATIENT
Start: 2024-02-02 | End: 2024-02-05

## 2024-02-02 NOTE — TELEPHONE ENCOUNTER
Per pharmacy patient states the Toujeo is to expensive and are wanting some thing more cost effective.

## 2024-02-04 ENCOUNTER — TELEPHONE (OUTPATIENT)
Dept: FAMILY MEDICINE CLINIC | Age: 49
End: 2024-02-04

## 2024-02-04 DIAGNOSIS — E11.69 TYPE 2 DIABETES MELLITUS WITH OBESITY (HCC): Primary | ICD-10-CM

## 2024-02-04 DIAGNOSIS — E66.9 TYPE 2 DIABETES MELLITUS WITH OBESITY (HCC): Primary | ICD-10-CM

## 2024-02-04 DIAGNOSIS — L65.9 HAIR LOSS: ICD-10-CM

## 2024-02-04 RX ORDER — INSULIN GLARGINE 100 [IU]/ML
10 INJECTION, SOLUTION SUBCUTANEOUS NIGHTLY
Qty: 3 ML | Refills: 0 | Status: SHIPPED | OUTPATIENT
Start: 2024-02-04 | End: 2024-03-05

## 2024-02-04 RX ORDER — PEN NEEDLE, DIABETIC 32GX 5/32"
1 NEEDLE, DISPOSABLE MISCELLANEOUS DAILY
Qty: 100 EACH | Refills: 3 | Status: SHIPPED | OUTPATIENT
Start: 2024-02-04

## 2024-02-14 DIAGNOSIS — E11.69 TYPE 2 DIABETES MELLITUS WITH OBESITY (HCC): ICD-10-CM

## 2024-02-14 DIAGNOSIS — E66.9 TYPE 2 DIABETES MELLITUS WITH OBESITY (HCC): ICD-10-CM

## 2024-02-14 RX ORDER — INSULIN GLARGINE 100 [IU]/ML
INJECTION, SOLUTION SUBCUTANEOUS
Qty: 4.5 ML | Refills: 0
Start: 2024-02-14 | End: 2024-03-15

## 2024-03-11 ENCOUNTER — PATIENT MESSAGE (OUTPATIENT)
Dept: FAMILY MEDICINE CLINIC | Age: 49
End: 2024-03-11

## 2024-03-11 NOTE — TELEPHONE ENCOUNTER
From: Anne Tate  To: Naun Doran  Sent: 3/11/2024 11:43 AM EDT  Subject: BS updates    Hello - I am back from my cruise, it was wonderful & for the most part my BS was quite good. I experienced a couple readings in the 70s & a few over 200 (when I made very poor choices for drinks & desserts for example Sunday March 3's high after lunch readings were a watermelon dina weaver I boarded the ship). Attached is a report for the last week or so. Please note that the times on this report are not accurate, my meter uses cellular data & when at sea it gets really, really confused.  Thanks!  Ellyn

## 2024-04-09 DIAGNOSIS — E11.69 TYPE 2 DIABETES MELLITUS WITH OBESITY (HCC): ICD-10-CM

## 2024-04-09 DIAGNOSIS — E66.9 TYPE 2 DIABETES MELLITUS WITH OBESITY (HCC): ICD-10-CM

## 2024-04-11 ENCOUNTER — COMMUNITY OUTREACH (OUTPATIENT)
Dept: FAMILY MEDICINE CLINIC | Age: 49
End: 2024-04-11

## 2024-04-15 RX ORDER — INSULIN GLARGINE 100 [IU]/ML
INJECTION, SOLUTION SUBCUTANEOUS
Qty: 20 ML | Refills: 1 | Status: SHIPPED | OUTPATIENT
Start: 2024-04-15 | End: 2024-07-14

## 2024-04-15 RX ORDER — INSULIN GLARGINE-YFGN 100 [IU]/ML
INJECTION, SOLUTION SUBCUTANEOUS
Refills: 0 | OUTPATIENT
Start: 2024-04-15

## 2024-04-15 NOTE — TELEPHONE ENCOUNTER
We call and confirm insulin, Lantus is what she had been on, if a different medication is cheaper I am more than willing to switch to that though.  Thank you

## 2024-04-15 NOTE — TELEPHONE ENCOUNTER
Anne Tate is requesting a refill on the following medication(s):  Requested Prescriptions     Pending Prescriptions Disp Refills    Insulin Glargine-yfgn (SEMGLEE, YFGN,) 100 UNIT/ML SOPN [Pharmacy Med Name: SEMGLEE PEN 3ML 5'S 100U/ML]  0       Last Visit Date (If Applicable):  1/8/2024    Next Visit Date:    7/8/2024

## 2024-05-20 DIAGNOSIS — I10 ESSENTIAL HYPERTENSION: ICD-10-CM

## 2024-05-20 RX ORDER — METOPROLOL SUCCINATE 50 MG/1
TABLET, EXTENDED RELEASE ORAL
Qty: 90 TABLET | Refills: 3 | Status: SHIPPED | OUTPATIENT
Start: 2024-05-20

## 2024-05-20 NOTE — TELEPHONE ENCOUNTER
Anne Tate is requesting a refill on the following medication(s):  Requested Prescriptions     Pending Prescriptions Disp Refills    metoprolol succinate (TOPROL XL) 50 MG extended release tablet [Pharmacy Med Name: METOPROLOL SUCCINATE ER TABS 50MG] 90 tablet 3     Sig: TAKE 1 TABLET DAILY       Last Visit Date (If Applicable):  1/8/2024    Next Visit Date:    7/8/2024

## 2024-06-22 DIAGNOSIS — E11.9 TYPE 2 DIABETES MELLITUS WITHOUT COMPLICATION, WITHOUT LONG-TERM CURRENT USE OF INSULIN (HCC): ICD-10-CM

## 2024-07-15 DIAGNOSIS — L65.9 HAIR LOSS: ICD-10-CM

## 2024-07-15 NOTE — TELEPHONE ENCOUNTER
Anne Tate is requesting a refill on the following medication(s):  Requested Prescriptions     Pending Prescriptions Disp Refills    finasteride (PROSCAR) 5 MG tablet [Pharmacy Med Name: FINASTERIDE TABS 5MG] 90 tablet 3     Sig: TAKE 1 TABLET DAILY       Last Visit Date (If Applicable):  1/8/2024    Next Visit Date:    Visit date not found

## 2024-07-16 RX ORDER — FINASTERIDE 5 MG/1
TABLET, FILM COATED ORAL
Qty: 90 TABLET | Refills: 3 | Status: SHIPPED | OUTPATIENT
Start: 2024-07-16

## 2025-01-31 NOTE — TELEPHONE ENCOUNTER
Pt needs ventolin to [facility]      Mervat Luz is requesting a refill on the following medication(s):  Requested Prescriptions      No prescriptions requested or ordered in this encounter       Last Visit Date (If Applicable):  8/8/9394    Next Visit Date:    Visit date not found Quality 47: Advance Care Plan: Advance Care Planning discussed and documented; advance care plan or surrogate decision maker documented in the medical record. Detail Level: Detailed Quality 226: Preventive Care And Screening: Tobacco Use: Screening And Cessation Intervention: Patient screened for tobacco use, is a smoker AND received Cessation Counseling within measurement period or in the six months prior to the measurement period